# Patient Record
Sex: FEMALE | Race: WHITE | NOT HISPANIC OR LATINO | Employment: FULL TIME | URBAN - METROPOLITAN AREA
[De-identification: names, ages, dates, MRNs, and addresses within clinical notes are randomized per-mention and may not be internally consistent; named-entity substitution may affect disease eponyms.]

---

## 2023-10-31 ENCOUNTER — TELEPHONE (OUTPATIENT)
Dept: PRIMARY CARE | Facility: CLINIC | Age: 53
End: 2023-10-31
Payer: COMMERCIAL

## 2023-11-09 ENCOUNTER — TELEPHONE (OUTPATIENT)
Dept: PRIMARY CARE | Facility: CLINIC | Age: 53
End: 2023-11-09
Payer: COMMERCIAL

## 2023-11-09 NOTE — TELEPHONE ENCOUNTER
PT CALLED HAS HAD RIGHT BLOCKED SINUS X 3 WEEKS NO OTHER ISSUES. TODAY HER RIGHT EAR STARTED TO BECOME RED HOT AND PAINFUL. 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 REQUESTING SOMETHING TO BE SENT TO [facility] IF POSSIBLE, SHE IS WILLING TO SCHEDULE FOLLOW UP NEXT WEEK IF NEEDED. HER DAUGHTER CAN PICK RX UP AND DELIVER TO HER AT WORK.

## 2023-11-10 ENCOUNTER — OFFICE VISIT (OUTPATIENT)
Dept: PRIMARY CARE | Facility: CLINIC | Age: 53
End: 2023-11-10
Payer: COMMERCIAL

## 2023-11-10 VITALS
OXYGEN SATURATION: 98 % | DIASTOLIC BLOOD PRESSURE: 86 MMHG | SYSTOLIC BLOOD PRESSURE: 122 MMHG | TEMPERATURE: 98.2 F | HEART RATE: 89 BPM | HEIGHT: 65 IN | WEIGHT: 280.6 LBS | BODY MASS INDEX: 46.75 KG/M2 | RESPIRATION RATE: 18 BRPM

## 2023-11-10 DIAGNOSIS — J01.90 ACUTE NON-RECURRENT SINUSITIS, UNSPECIFIED LOCATION: Primary | ICD-10-CM

## 2023-11-10 DIAGNOSIS — J45.901 MODERATE ASTHMA WITH EXACERBATION, UNSPECIFIED WHETHER PERSISTENT (HHS-HCC): ICD-10-CM

## 2023-11-10 DIAGNOSIS — R89.9 ABNORMAL LABORATORY TEST: ICD-10-CM

## 2023-11-10 DIAGNOSIS — Z23 ENCOUNTER FOR IMMUNIZATION: ICD-10-CM

## 2023-11-10 PROBLEM — K21.9 GASTROESOPHAGEAL REFLUX DISEASE: Status: ACTIVE | Noted: 2023-11-10

## 2023-11-10 PROBLEM — E55.9 VITAMIN D DEFICIENCY: Status: ACTIVE | Noted: 2023-11-10

## 2023-11-10 PROBLEM — K59.00 CONSTIPATION: Status: ACTIVE | Noted: 2023-11-10

## 2023-11-10 PROBLEM — F43.9 STRESS AT HOME: Status: ACTIVE | Noted: 2023-11-10

## 2023-11-10 PROBLEM — K86.2 CYST OF PANCREAS (HHS-HCC): Status: ACTIVE | Noted: 2023-11-10

## 2023-11-10 PROBLEM — K82.9 DISORDER OF GALLBLADDER: Status: ACTIVE | Noted: 2023-11-10

## 2023-11-10 PROBLEM — J32.9 CHRONIC SINUSITIS: Status: ACTIVE | Noted: 2023-11-10

## 2023-11-10 PROBLEM — R05.9 COUGH: Status: ACTIVE | Noted: 2023-11-10

## 2023-11-10 PROBLEM — F41.9 ANXIETY: Status: ACTIVE | Noted: 2023-11-10

## 2023-11-10 PROBLEM — I10 HYPERTENSION: Status: ACTIVE | Noted: 2023-11-10

## 2023-11-10 PROBLEM — K46.9 ABDOMINAL HERNIA: Status: ACTIVE | Noted: 2023-11-10

## 2023-11-10 PROBLEM — G47.00 INSOMNIA: Status: ACTIVE | Noted: 2023-11-10

## 2023-11-10 LAB
ALBUMIN SERPL-MCNC: 4.2 G/DL (ref 3.5–5)
ALP BLD-CCNC: 58 U/L (ref 35–125)
ALT SERPL-CCNC: 10 U/L (ref 5–40)
ANION GAP SERPL CALC-SCNC: 13 MMOL/L
AST SERPL-CCNC: 10 U/L (ref 5–40)
BILIRUB SERPL-MCNC: 0.6 MG/DL (ref 0.1–1.2)
BUN SERPL-MCNC: 13 MG/DL (ref 8–25)
CALCIUM SERPL-MCNC: 9.2 MG/DL (ref 8.5–10.4)
CHLORIDE SERPL-SCNC: 104 MMOL/L (ref 97–107)
CO2 SERPL-SCNC: 21 MMOL/L (ref 24–31)
CREAT SERPL-MCNC: 0.7 MG/DL (ref 0.4–1.6)
GFR SERPL CREATININE-BSD FRML MDRD: >90 ML/MIN/1.73M*2
GLUCOSE SERPL-MCNC: 91 MG/DL (ref 65–99)
POTASSIUM SERPL-SCNC: 4.5 MMOL/L (ref 3.4–5.1)
PROT SERPL-MCNC: 6.7 G/DL (ref 5.9–7.9)
SODIUM SERPL-SCNC: 138 MMOL/L (ref 133–145)

## 2023-11-10 PROCEDURE — 36415 COLL VENOUS BLD VENIPUNCTURE: CPT | Performed by: NURSE PRACTITIONER

## 2023-11-10 PROCEDURE — 99213 OFFICE O/P EST LOW 20 MIN: CPT | Performed by: NURSE PRACTITIONER

## 2023-11-10 PROCEDURE — 3074F SYST BP LT 130 MM HG: CPT | Performed by: NURSE PRACTITIONER

## 2023-11-10 PROCEDURE — 90686 IIV4 VACC NO PRSV 0.5 ML IM: CPT | Performed by: NURSE PRACTITIONER

## 2023-11-10 PROCEDURE — 80053 COMPREHEN METABOLIC PANEL: CPT | Performed by: NURSE PRACTITIONER

## 2023-11-10 PROCEDURE — 1036F TOBACCO NON-USER: CPT | Performed by: NURSE PRACTITIONER

## 2023-11-10 PROCEDURE — 3079F DIAST BP 80-89 MM HG: CPT | Performed by: NURSE PRACTITIONER

## 2023-11-10 RX ORDER — MELOXICAM 15 MG/1
15 TABLET ORAL DAILY
COMMUNITY
End: 2024-05-20 | Stop reason: SDUPTHER

## 2023-11-10 RX ORDER — FLUTICASONE PROPIONATE AND SALMETEROL 500; 50 UG/1; UG/1
1 POWDER RESPIRATORY (INHALATION)
COMMUNITY
Start: 2023-07-21 | End: 2023-12-15 | Stop reason: SDUPTHER

## 2023-11-10 RX ORDER — ALBUTEROL SULFATE 0.83 MG/ML
2.5 SOLUTION RESPIRATORY (INHALATION) EVERY 6 HOURS PRN
COMMUNITY
Start: 2023-08-15

## 2023-11-10 RX ORDER — AMOXICILLIN AND CLAVULANATE POTASSIUM 875; 125 MG/1; MG/1
875 TABLET, FILM COATED ORAL 2 TIMES DAILY
Qty: 20 TABLET | Refills: 0 | Status: SHIPPED | OUTPATIENT
Start: 2023-11-10 | End: 2023-11-20

## 2023-11-10 RX ORDER — FLUCONAZOLE 100 MG/1
100 TABLET ORAL DAILY
Qty: 3 TABLET | Refills: 3 | Status: SHIPPED | OUTPATIENT
Start: 2023-11-10 | End: 2023-11-13 | Stop reason: SDUPTHER

## 2023-11-10 RX ORDER — METHYLPREDNISOLONE 4 MG/1
TABLET ORAL
Qty: 21 TABLET | Refills: 0 | Status: SHIPPED | OUTPATIENT
Start: 2023-11-10 | End: 2023-11-17

## 2023-11-10 RX ORDER — ALBUTEROL SULFATE 90 UG/1
2 AEROSOL, METERED RESPIRATORY (INHALATION) EVERY 4 HOURS
COMMUNITY
End: 2024-04-30

## 2023-11-10 RX ORDER — FLUTICASONE PROPIONATE 50 MCG
1 SPRAY, SUSPENSION (ML) NASAL DAILY
COMMUNITY
Start: 2021-08-12

## 2023-11-10 RX ORDER — ERGOCALCIFEROL 1.25 MG/1
1 CAPSULE ORAL
COMMUNITY
Start: 2023-11-01 | End: 2024-03-04

## 2023-11-10 RX ORDER — LORATADINE 10 MG/1
10 TABLET ORAL DAILY
COMMUNITY
Start: 2022-08-08

## 2023-11-10 RX ORDER — LISINOPRIL 10 MG/1
10 TABLET ORAL DAILY
COMMUNITY
Start: 2022-02-23 | End: 2024-03-04

## 2023-11-10 ASSESSMENT — PATIENT HEALTH QUESTIONNAIRE - PHQ9
1. LITTLE INTEREST OR PLEASURE IN DOING THINGS: NOT AT ALL
SUM OF ALL RESPONSES TO PHQ9 QUESTIONS 1 AND 2: 0
2. FEELING DOWN, DEPRESSED OR HOPELESS: NOT AT ALL

## 2023-11-10 ASSESSMENT — LIFESTYLE VARIABLES
SKIP TO QUESTIONS 9-10: 1
HOW OFTEN DO YOU HAVE SIX OR MORE DRINKS ON ONE OCCASION: NEVER
AUDIT-C TOTAL SCORE: 1
HOW OFTEN DO YOU HAVE A DRINK CONTAINING ALCOHOL: MONTHLY OR LESS
HOW MANY STANDARD DRINKS CONTAINING ALCOHOL DO YOU HAVE ON A TYPICAL DAY: 1 OR 2

## 2023-11-10 ASSESSMENT — ENCOUNTER SYMPTOMS
SINUS PRESSURE: 1
FATIGUE: 1
FACIAL SWELLING: 1
SINUS PAIN: 1
COUGH: 1

## 2023-11-10 ASSESSMENT — PAIN SCALES - GENERAL: PAINLEVEL: 3

## 2023-11-10 NOTE — PROGRESS NOTES
"Subjective   Patient ID: Dee Mata is a 53 y.o. female who presents for No chief complaint on file..    Has congestion no fever facial pain. history of          Review of Systems   Constitutional:  Positive for fatigue.   HENT:  Positive for ear pain, facial swelling, sinus pressure and sinus pain.    Respiratory:  Positive for cough.        Objective   /86   Pulse 89   Temp 36.8 °C (98.2 °F)   Resp 18   Ht 1.651 m (5' 5\")   Wt 127 kg (280 lb 9.6 oz)   SpO2 98%   BMI 46.69 kg/m²     Physical Exam  Constitutional:       Appearance: Normal appearance.   HENT:      Left Ear: Tympanic membrane normal.      Nose: Congestion and rhinorrhea present.      Mouth/Throat:      Mouth: Mucous membranes are moist.   Eyes:      Extraocular Movements: Extraocular movements intact.      Pupils: Pupils are equal, round, and reactive to light.   Pulmonary:      Effort: Pulmonary effort is normal.      Comments: Loose cough   Neurological:      Mental Status: She is alert.         Assessment/Plan   Problem List Items Addressed This Visit             ICD-10-CM    Exacerbation of asthma J45.901    Relevant Medications    methylPREDNISolone (Medrol Dospak) 4 mg tablets     Other Visit Diagnoses         Codes    Acute non-recurrent sinusitis, unspecified location    -  Primary J01.90    Relevant Medications    amoxicillin-pot clavulanate (Augmentin) 875-125 mg tablet    fluconazole (Diflucan) 100 mg tablet    Abnormal laboratory test     R89.9    Relevant Orders    Comprehensive Metabolic Panel    Encounter for immunization     Z23               "

## 2023-11-13 ENCOUNTER — TELEPHONE (OUTPATIENT)
Dept: PRIMARY CARE | Facility: CLINIC | Age: 53
End: 2023-11-13
Payer: COMMERCIAL

## 2023-11-13 DIAGNOSIS — J01.90 ACUTE NON-RECURRENT SINUSITIS, UNSPECIFIED LOCATION: ICD-10-CM

## 2023-11-15 RX ORDER — FLUCONAZOLE 100 MG/1
100 TABLET ORAL DAILY
Qty: 3 TABLET | Refills: 1 | Status: SHIPPED | OUTPATIENT
Start: 2023-11-15 | End: 2023-11-21

## 2023-12-13 ENCOUNTER — TELEPHONE (OUTPATIENT)
Dept: PRIMARY CARE | Facility: CLINIC | Age: 53
End: 2023-12-13
Payer: COMMERCIAL

## 2023-12-13 DIAGNOSIS — J45.40 MODERATE PERSISTENT ASTHMA, UNSPECIFIED WHETHER COMPLICATED (HHS-HCC): Primary | ICD-10-CM

## 2023-12-15 RX ORDER — FLUTICASONE PROPIONATE AND SALMETEROL 500; 50 UG/1; UG/1
1 POWDER RESPIRATORY (INHALATION)
Qty: 60 EACH | Refills: 1 | Status: SHIPPED | OUTPATIENT
Start: 2023-12-15

## 2024-01-31 ENCOUNTER — OFFICE VISIT (OUTPATIENT)
Dept: PRIMARY CARE | Facility: CLINIC | Age: 54
End: 2024-01-31
Payer: COMMERCIAL

## 2024-01-31 VITALS
WEIGHT: 285.2 LBS | BODY MASS INDEX: 47.52 KG/M2 | RESPIRATION RATE: 18 BRPM | DIASTOLIC BLOOD PRESSURE: 82 MMHG | TEMPERATURE: 98.6 F | OXYGEN SATURATION: 98 % | HEIGHT: 65 IN | SYSTOLIC BLOOD PRESSURE: 126 MMHG | HEART RATE: 90 BPM

## 2024-01-31 DIAGNOSIS — E55.9 VITAMIN D DEFICIENCY: ICD-10-CM

## 2024-01-31 DIAGNOSIS — E66.9 CLASS 2 OBESITY WITH BODY MASS INDEX (BMI) OF 38.0 TO 38.9 IN ADULT, UNSPECIFIED OBESITY TYPE, UNSPECIFIED WHETHER SERIOUS COMORBIDITY PRESENT: Primary | ICD-10-CM

## 2024-01-31 DIAGNOSIS — I10 HYPERTENSION, UNSPECIFIED TYPE: ICD-10-CM

## 2024-01-31 DIAGNOSIS — R53.83 OTHER FATIGUE: ICD-10-CM

## 2024-01-31 DIAGNOSIS — N95.1 HOT FLASHES DUE TO MENOPAUSE: ICD-10-CM

## 2024-01-31 DIAGNOSIS — Z13.220 SCREENING, LIPID: ICD-10-CM

## 2024-01-31 LAB
25(OH)D3 SERPL-MCNC: 37 NG/ML (ref 31–100)
ALBUMIN SERPL-MCNC: 4.4 G/DL (ref 3.5–5)
ALP BLD-CCNC: 65 U/L (ref 35–125)
ALT SERPL-CCNC: 6 U/L (ref 5–40)
ANION GAP SERPL CALC-SCNC: 13 MMOL/L
AST SERPL-CCNC: 9 U/L (ref 5–40)
BASOPHILS # BLD AUTO: 0.09 X10*3/UL (ref 0–0.1)
BASOPHILS NFR BLD AUTO: 0.8 %
BILIRUB SERPL-MCNC: 0.5 MG/DL (ref 0.1–1.2)
BUN SERPL-MCNC: 17 MG/DL (ref 8–25)
CALCIUM SERPL-MCNC: 9.4 MG/DL (ref 8.5–10.4)
CHLORIDE SERPL-SCNC: 104 MMOL/L (ref 97–107)
CHOLEST SERPL-MCNC: 157 MG/DL (ref 133–200)
CHOLEST/HDLC SERPL: 3.1 {RATIO}
CO2 SERPL-SCNC: 22 MMOL/L (ref 24–31)
CREAT SERPL-MCNC: 0.7 MG/DL (ref 0.4–1.6)
EGFRCR SERPLBLD CKD-EPI 2021: >90 ML/MIN/1.73M*2
EOSINOPHIL # BLD AUTO: 0.39 X10*3/UL (ref 0–0.7)
EOSINOPHIL NFR BLD AUTO: 3.4 %
ERYTHROCYTE [DISTWIDTH] IN BLOOD BY AUTOMATED COUNT: 12.1 % (ref 11.5–14.5)
GLUCOSE SERPL-MCNC: 91 MG/DL (ref 65–99)
HCT VFR BLD AUTO: 42.3 % (ref 36–46)
HDLC SERPL-MCNC: 51 MG/DL
HGB BLD-MCNC: 14.4 G/DL (ref 12–16)
IMM GRANULOCYTES # BLD AUTO: 0.07 X10*3/UL (ref 0–0.7)
IMM GRANULOCYTES NFR BLD AUTO: 0.6 % (ref 0–0.9)
LDLC SERPL CALC-MCNC: 84 MG/DL (ref 65–130)
LYMPHOCYTES # BLD AUTO: 2.44 X10*3/UL (ref 1.2–4.8)
LYMPHOCYTES NFR BLD AUTO: 21.3 %
MCH RBC QN AUTO: 30.8 PG (ref 26–34)
MCHC RBC AUTO-ENTMCNC: 34 G/DL (ref 32–36)
MCV RBC AUTO: 90 FL (ref 80–100)
MONOCYTES # BLD AUTO: 0.78 X10*3/UL (ref 0.1–1)
MONOCYTES NFR BLD AUTO: 6.8 %
NEUTROPHILS # BLD AUTO: 7.69 X10*3/UL (ref 1.2–7.7)
NEUTROPHILS NFR BLD AUTO: 67.1 %
NRBC BLD-RTO: 0 /100 WBCS (ref 0–0)
PLATELET # BLD AUTO: 262 X10*3/UL (ref 150–450)
POTASSIUM SERPL-SCNC: 4.3 MMOL/L (ref 3.4–5.1)
PROT SERPL-MCNC: 6.8 G/DL (ref 5.9–7.9)
RBC # BLD AUTO: 4.68 X10*6/UL (ref 4–5.2)
SODIUM SERPL-SCNC: 139 MMOL/L (ref 133–145)
TRIGL SERPL-MCNC: 111 MG/DL (ref 40–150)
TSH SERPL DL<=0.05 MIU/L-ACNC: 1.78 MIU/L (ref 0.27–4.2)
WBC # BLD AUTO: 11.5 X10*3/UL (ref 4.4–11.3)

## 2024-01-31 PROCEDURE — 84443 ASSAY THYROID STIM HORMONE: CPT | Performed by: NURSE PRACTITIONER

## 2024-01-31 PROCEDURE — 1036F TOBACCO NON-USER: CPT | Performed by: NURSE PRACTITIONER

## 2024-01-31 PROCEDURE — 82306 VITAMIN D 25 HYDROXY: CPT | Performed by: NURSE PRACTITIONER

## 2024-01-31 PROCEDURE — 3074F SYST BP LT 130 MM HG: CPT | Performed by: NURSE PRACTITIONER

## 2024-01-31 PROCEDURE — 99214 OFFICE O/P EST MOD 30 MIN: CPT | Performed by: NURSE PRACTITIONER

## 2024-01-31 PROCEDURE — 36415 COLL VENOUS BLD VENIPUNCTURE: CPT | Performed by: NURSE PRACTITIONER

## 2024-01-31 PROCEDURE — 80061 LIPID PANEL: CPT | Performed by: NURSE PRACTITIONER

## 2024-01-31 PROCEDURE — 85025 COMPLETE CBC W/AUTO DIFF WBC: CPT | Performed by: NURSE PRACTITIONER

## 2024-01-31 PROCEDURE — 3079F DIAST BP 80-89 MM HG: CPT | Performed by: NURSE PRACTITIONER

## 2024-01-31 PROCEDURE — 83001 ASSAY OF GONADOTROPIN (FSH): CPT | Mod: WESLAB | Performed by: NURSE PRACTITIONER

## 2024-01-31 PROCEDURE — 80053 COMPREHEN METABOLIC PANEL: CPT | Performed by: NURSE PRACTITIONER

## 2024-01-31 PROCEDURE — 3008F BODY MASS INDEX DOCD: CPT | Performed by: NURSE PRACTITIONER

## 2024-01-31 ASSESSMENT — LIFESTYLE VARIABLES
HOW OFTEN DO YOU HAVE A DRINK CONTAINING ALCOHOL: MONTHLY OR LESS
SKIP TO QUESTIONS 9-10: 1
HOW OFTEN DO YOU HAVE SIX OR MORE DRINKS ON ONE OCCASION: NEVER
AUDIT-C TOTAL SCORE: 1
HOW MANY STANDARD DRINKS CONTAINING ALCOHOL DO YOU HAVE ON A TYPICAL DAY: 1 OR 2

## 2024-01-31 ASSESSMENT — ENCOUNTER SYMPTOMS
ARTHRALGIAS: 1
FATIGUE: 1
SHORTNESS OF BREATH: 1

## 2024-01-31 ASSESSMENT — PAIN SCALES - GENERAL: PAINLEVEL: 2

## 2024-01-31 NOTE — PATIENT INSTRUCTIONS
PT IS OBESE, DISCUSSED WEIGHT LOSS, NEEDS HELP. WITH  WEIGHT LOSS. DISCUSSED WEIGHT LOSS MEDICATIONS, WILL SEE IF APPROVED.VENECIA KLINE AND WILL SEE ILSA APPROVED

## 2024-01-31 NOTE — PROGRESS NOTES
"Subjective   Patient ID: Dee Mata is a 53 y.o. female who presents for Med Management (PT IS HERE TO DISCUSS WEIGHT LOSS MEDICATIONS), Follow-up (PT WOULD LIKE REFERRAL TO PULMONOLOGY REFERRAL SHE WAS GIVEN IN THE PAST. HAD REFERRAL IN AUGUST TO DR. MOTTA BUT SHE DID NOT SCHEDULE ), and Arm Pain (PT TRIPPED OVER HER JACKET TODAY WALKING DOWN 3 STEPS IN [place]AR[place]. PT HAS LEFT ELBOW PAIN AND DISCOMFORT IN LEFT PALM/ THUMB AREA. ).    WANTS TOSEEIF ELIGIBLE FOR WEIGHT LOSS. DISCUSEED NWEIGHT PT WAS AROUND 180. HAPPY.HAS FAMILY HISTORY OF OBESITY    Arm Pain   The incident occurred 6 to 12 hours ago. The incident occurred at work. The injury mechanism was a fall. The pain is present in the left wrist. The quality of the pain is described as aching and shooting. The pain is at a severity of 4/10. The pain is mild. The pain has been Intermittent since the incident. Nothing aggravates the symptoms. She has tried elevation, ice and NSAIDs for the symptoms. The treatment provided mild relief.        Review of Systems   Constitutional:  Positive for fatigue.   Respiratory:  Positive for shortness of breath.    Musculoskeletal:  Positive for arthralgias.       Objective   /82   Pulse 90   Temp 37 °C (98.6 °F)   Resp 18   Ht 1.651 m (5' 5\")   Wt 129 kg (285 lb 3.2 oz)   SpO2 98%   BMI 47.46 kg/m²     Physical Exam  Constitutional:       General: She is not in acute distress.     Appearance: Normal appearance. She is obese.   HENT:      Head: Normocephalic and atraumatic.      Nose: Nose normal.   Cardiovascular:      Rate and Rhythm: Normal rate and regular rhythm.      Heart sounds: No murmur heard.  Pulmonary:      Effort: Pulmonary effort is normal.      Breath sounds: Normal breath sounds. No wheezing.   Abdominal:      General: Bowel sounds are normal.   Neurological:      Mental Status: She is alert.         Assessment/Plan   Problem List Items Addressed This Visit    None  Visit Diagnoses         Codes "    Class 2 obesity with body mass index (BMI) of 38.0 to 38.9 in adult, unspecified obesity type, unspecified whether serious comorbidity present    -  Primary E66.9, Z68.38

## 2024-02-01 ENCOUNTER — TELEPHONE (OUTPATIENT)
Dept: PHARMACY | Facility: CLINIC | Age: 54
End: 2024-02-01

## 2024-02-01 LAB — FSH SERPL-ACNC: 8 IU/L

## 2024-02-06 DIAGNOSIS — E66.01 CLASS 3 SEVERE OBESITY WITH BODY MASS INDEX (BMI) OF 45.0 TO 49.9 IN ADULT, UNSPECIFIED OBESITY TYPE, UNSPECIFIED WHETHER SERIOUS COMORBIDITY PRESENT (MULTI): Primary | ICD-10-CM

## 2024-02-06 RX ORDER — TIRZEPATIDE 2.5 MG/.5ML
2.5 INJECTION, SOLUTION SUBCUTANEOUS
Qty: 2 ML | Refills: 0 | Status: SHIPPED | OUTPATIENT
Start: 2024-02-06 | End: 2024-02-12 | Stop reason: SDUPTHER

## 2024-02-06 NOTE — TELEPHONE ENCOUNTER
Spoke with pt over the phone. Reviewed Zepbound MOA, side effects/precautions, storage, dosing and administration. Discussed risks and benefits, aware to call office if any issues/concerns with side effects. History of pancreatitis in 2017, also had lap janie post mono. Pt understands the increased risk of recurrent pancreatitis with a personal history and would like to proceed. Provider informed of patient's history and also ok with proceeding with GLP-1 agonist treatment.   Medication counseling provided by: ORI JacksonD, BCPS

## 2024-02-10 DIAGNOSIS — R00.2 PALPITATIONS: ICD-10-CM

## 2024-02-12 DIAGNOSIS — E66.01 CLASS 3 SEVERE OBESITY WITH BODY MASS INDEX (BMI) OF 45.0 TO 49.9 IN ADULT, UNSPECIFIED OBESITY TYPE, UNSPECIFIED WHETHER SERIOUS COMORBIDITY PRESENT (MULTI): ICD-10-CM

## 2024-02-12 RX ORDER — DEXLANSOPRAZOLE 60 MG/1
1 CAPSULE, DELAYED RELEASE ORAL DAILY
Qty: 90 CAPSULE | Refills: 1 | Status: SHIPPED | OUTPATIENT
Start: 2024-02-12

## 2024-02-12 RX ORDER — TIRZEPATIDE 2.5 MG/.5ML
2.5 INJECTION, SOLUTION SUBCUTANEOUS
Qty: 2 ML | Refills: 0 | Status: SHIPPED | OUTPATIENT
Start: 2024-02-12 | End: 2024-03-20 | Stop reason: SDUPTHER

## 2024-02-12 NOTE — TELEPHONE ENCOUNTER
Pt called and states Zepbound is approved if sent [facility]. Pharmacy loaded

## 2024-03-02 DIAGNOSIS — E55.9 VITAMIN D DEFICIENCY: Primary | ICD-10-CM

## 2024-03-02 DIAGNOSIS — I10 HYPERTENSION, UNSPECIFIED TYPE: ICD-10-CM

## 2024-03-04 ENCOUNTER — TELEPHONE (OUTPATIENT)
Dept: PRIMARY CARE | Facility: CLINIC | Age: 54
End: 2024-03-04
Payer: COMMERCIAL

## 2024-03-04 DIAGNOSIS — R11.2 NAUSEA AND VOMITING, UNSPECIFIED VOMITING TYPE: Primary | ICD-10-CM

## 2024-03-04 RX ORDER — LISINOPRIL 10 MG/1
10 TABLET ORAL DAILY
Qty: 90 TABLET | Refills: 0 | Status: SHIPPED | OUTPATIENT
Start: 2024-03-04 | End: 2024-05-30

## 2024-03-04 RX ORDER — ONDANSETRON 4 MG/1
8 TABLET, FILM COATED ORAL EVERY 8 HOURS PRN
Qty: 15 TABLET | Refills: 0 | Status: SHIPPED | OUTPATIENT
Start: 2024-03-04 | End: 2024-03-09

## 2024-03-04 RX ORDER — ERGOCALCIFEROL 1.25 MG/1
1 CAPSULE ORAL
Qty: 12 CAPSULE | Refills: 0 | Status: SHIPPED | OUTPATIENT
Start: 2024-03-04 | End: 2024-05-28

## 2024-03-04 NOTE — TELEPHONE ENCOUNTER
Pt called and is requesting zofran. Pt is home with stomach bug. Pt states she is on zepbound and wants to know when she can restart med. Pt afraid she will get dehydrated if she starts med too quickly.

## 2024-03-04 NOTE — TELEPHONE ENCOUNTER
Pt called and is requesting zofran. Pt is home with stomach bug and has vomiting/diarrhea. Requesting Zofran. Pt states she is on zepbound and wants to know when she can restart med. Pt afraid she will get dehydrated if she starts med too quickly.

## 2024-03-20 ENCOUNTER — OFFICE VISIT (OUTPATIENT)
Dept: PRIMARY CARE | Facility: CLINIC | Age: 54
End: 2024-03-20
Payer: COMMERCIAL

## 2024-03-20 VITALS
SYSTOLIC BLOOD PRESSURE: 124 MMHG | HEART RATE: 100 BPM | WEIGHT: 265 LBS | RESPIRATION RATE: 18 BRPM | HEIGHT: 65 IN | OXYGEN SATURATION: 96 % | BODY MASS INDEX: 44.15 KG/M2 | DIASTOLIC BLOOD PRESSURE: 88 MMHG | TEMPERATURE: 97.5 F

## 2024-03-20 DIAGNOSIS — E66.01 CLASS 3 SEVERE OBESITY WITH BODY MASS INDEX (BMI) OF 45.0 TO 49.9 IN ADULT, UNSPECIFIED OBESITY TYPE, UNSPECIFIED WHETHER SERIOUS COMORBIDITY PRESENT (MULTI): ICD-10-CM

## 2024-03-20 DIAGNOSIS — E66.9 CLASS 2 OBESITY WITH BODY MASS INDEX (BMI) OF 38.0 TO 38.9 IN ADULT, UNSPECIFIED OBESITY TYPE, UNSPECIFIED WHETHER SERIOUS COMORBIDITY PRESENT: Primary | ICD-10-CM

## 2024-03-20 PROBLEM — T46.4X5A ADVERSE EFFECT OF ANGIOTENSIN-CONVERTING ENZYME INHIBITOR: Status: ACTIVE | Noted: 2024-03-20

## 2024-03-20 PROBLEM — M94.20 CHONDROMALACIA: Status: ACTIVE | Noted: 2022-04-15

## 2024-03-20 PROCEDURE — 99213 OFFICE O/P EST LOW 20 MIN: CPT | Performed by: NURSE PRACTITIONER

## 2024-03-20 PROCEDURE — 3008F BODY MASS INDEX DOCD: CPT | Performed by: NURSE PRACTITIONER

## 2024-03-20 PROCEDURE — 3074F SYST BP LT 130 MM HG: CPT | Performed by: NURSE PRACTITIONER

## 2024-03-20 PROCEDURE — 3079F DIAST BP 80-89 MM HG: CPT | Performed by: NURSE PRACTITIONER

## 2024-03-20 PROCEDURE — 1036F TOBACCO NON-USER: CPT | Performed by: NURSE PRACTITIONER

## 2024-03-20 RX ORDER — TIRZEPATIDE 2.5 MG/.5ML
2.5 INJECTION, SOLUTION SUBCUTANEOUS
Qty: 2 ML | Refills: 2 | Status: SHIPPED | OUTPATIENT
Start: 2024-03-20 | End: 2024-05-19 | Stop reason: SDUPTHER

## 2024-03-20 ASSESSMENT — LIFESTYLE VARIABLES
HOW OFTEN DO YOU HAVE A DRINK CONTAINING ALCOHOL: MONTHLY OR LESS
HOW OFTEN DO YOU HAVE SIX OR MORE DRINKS ON ONE OCCASION: NEVER
AUDIT-C TOTAL SCORE: 1
SKIP TO QUESTIONS 9-10: 1
HOW MANY STANDARD DRINKS CONTAINING ALCOHOL DO YOU HAVE ON A TYPICAL DAY: 1 OR 2

## 2024-03-20 ASSESSMENT — PATIENT HEALTH QUESTIONNAIRE - PHQ9
1. LITTLE INTEREST OR PLEASURE IN DOING THINGS: NOT AT ALL
SUM OF ALL RESPONSES TO PHQ9 QUESTIONS 1 AND 2: 0
1. LITTLE INTEREST OR PLEASURE IN DOING THINGS: NOT AT ALL
2. FEELING DOWN, DEPRESSED OR HOPELESS: NOT AT ALL
SUM OF ALL RESPONSES TO PHQ9 QUESTIONS 1 AND 2: 0
2. FEELING DOWN, DEPRESSED OR HOPELESS: NOT AT ALL

## 2024-03-20 ASSESSMENT — PAIN SCALES - GENERAL: PAINLEVEL: 0-NO PAIN

## 2024-03-21 ENCOUNTER — TELEPHONE (OUTPATIENT)
Dept: PRIMARY CARE | Facility: CLINIC | Age: 54
End: 2024-03-21
Payer: COMMERCIAL

## 2024-03-21 DIAGNOSIS — E66.01 CLASS 3 SEVERE OBESITY WITHOUT SERIOUS COMORBIDITY WITH BODY MASS INDEX (BMI) OF 40.0 TO 44.9 IN ADULT, UNSPECIFIED OBESITY TYPE (MULTI): Primary | ICD-10-CM

## 2024-03-21 NOTE — TELEPHONE ENCOUNTER
Pharmacy called and needs clarification on Zepbound directions. Pharmacy states zepbound comes in a box of 4 pens which is 2 ml. Needs clarification

## 2024-03-21 NOTE — PROGRESS NOTES
"Subjective   Patient ID: Dee Mata is a 53 y.o. female who presents for Follow-up (Pt here for follow up on zepbound. Pt states med is working well and feels great).    HERE FOR MED CHECK. NO ISSUES. DOING WEL.L NO PROBLEMS HAPPY       Review of Systems   All other systems reviewed and are negative.      Objective   /88   Pulse 100   Temp 36.4 °C (97.5 °F)   Resp 18   Ht 1.651 m (5' 5\")   Wt 120 kg (265 lb)   SpO2 96%   BMI 44.10 kg/m²     Physical Exam  Constitutional:       Appearance: Normal appearance. She is obese.   HENT:      Head: Normocephalic.      Right Ear: Tympanic membrane normal.      Left Ear: Tympanic membrane normal.      Nose: Congestion present.      Mouth/Throat:      Mouth: Mucous membranes are moist.   Cardiovascular:      Rate and Rhythm: Normal rate and regular rhythm.   Pulmonary:      Effort: Pulmonary effort is normal.   Abdominal:      General: Bowel sounds are normal.       Assessment/Plan          "

## 2024-03-22 RX ORDER — TIRZEPATIDE 5 MG/.5ML
5 INJECTION, SOLUTION SUBCUTANEOUS
Qty: 2 ML | Refills: 0 | Status: SHIPPED | OUTPATIENT
Start: 2024-03-22

## 2024-03-25 PROCEDURE — RXMED WILLOW AMBULATORY MEDICATION CHARGE

## 2024-03-26 ENCOUNTER — PHARMACY VISIT (OUTPATIENT)
Dept: PHARMACY | Facility: CLINIC | Age: 54
End: 2024-03-26
Payer: MEDICARE

## 2024-04-15 ENCOUNTER — OFFICE VISIT (OUTPATIENT)
Dept: PRIMARY CARE | Facility: CLINIC | Age: 54
End: 2024-04-15
Payer: COMMERCIAL

## 2024-04-15 VITALS
RESPIRATION RATE: 20 BRPM | OXYGEN SATURATION: 96 % | BODY MASS INDEX: 42.32 KG/M2 | HEIGHT: 65 IN | WEIGHT: 254 LBS | HEART RATE: 116 BPM | TEMPERATURE: 97.9 F

## 2024-04-15 DIAGNOSIS — J45.901 MODERATE ASTHMA WITH EXACERBATION, UNSPECIFIED WHETHER PERSISTENT (HHS-HCC): Primary | ICD-10-CM

## 2024-04-15 PROCEDURE — 3008F BODY MASS INDEX DOCD: CPT | Performed by: NURSE PRACTITIONER

## 2024-04-15 PROCEDURE — 1036F TOBACCO NON-USER: CPT | Performed by: NURSE PRACTITIONER

## 2024-04-15 PROCEDURE — 99213 OFFICE O/P EST LOW 20 MIN: CPT | Performed by: NURSE PRACTITIONER

## 2024-04-15 RX ORDER — PREDNISONE 20 MG/1
TABLET ORAL
Qty: 21 TABLET | Refills: 0 | Status: SHIPPED | OUTPATIENT
Start: 2024-04-15 | End: 2024-04-30 | Stop reason: SDUPTHER

## 2024-04-15 ASSESSMENT — PAIN SCALES - GENERAL: PAINLEVEL: 0-NO PAIN

## 2024-04-15 ASSESSMENT — PATIENT HEALTH QUESTIONNAIRE - PHQ9
1. LITTLE INTEREST OR PLEASURE IN DOING THINGS: NOT AT ALL
2. FEELING DOWN, DEPRESSED OR HOPELESS: NOT AT ALL
SUM OF ALL RESPONSES TO PHQ9 QUESTIONS 1 AND 2: 0

## 2024-04-15 ASSESSMENT — ENCOUNTER SYMPTOMS
WHEEZING: 1
CHEST TIGHTNESS: 1
COUGH: 1
SHORTNESS OF BREATH: 1

## 2024-04-15 NOTE — PATIENT INSTRUCTIONS
Discussed if worsening call. Use nebulizer. Trying an new inhaler, discussed should have a consultation with pulmonary

## 2024-04-15 NOTE — PROGRESS NOTES
"Subjective   Patient ID: Dee Mata is a 53 y.o. female who presents for Cough (Pt c/o cough, congestion, pain in teeth since last tuesday).    Started this past weekend. Has had a hard time breathing. Wheezing           Review of Systems   Respiratory:  Positive for cough, chest tightness, shortness of breath and wheezing.        Objective   Pulse (!) 116   Temp 36.6 °C (97.9 °F)   Resp 20   Ht 1.651 m (5' 5\")   Wt 115 kg (254 lb)   SpO2 96%   BMI 42.27 kg/m²     Physical Exam  Constitutional:       Appearance: Normal appearance.   Cardiovascular:      Rate and Rhythm: Normal rate and regular rhythm.   Pulmonary:      Breath sounds: Wheezing present.   Neurological:      Mental Status: She is alert.         Assessment/Plan   Problem List Items Addressed This Visit             ICD-10-CM    Exacerbation of asthma ([facility]) - Primary J45.901    Relevant Medications    predniSONE (Deltasone) 20 mg tablet    budesonide-glycopyr-formoterol (BREZTRI) 160-9-4.8 mcg/actuation HFA aerosol inhaler    Other Relevant Orders    Referral to Pulmonology          "

## 2024-04-18 PROCEDURE — RXMED WILLOW AMBULATORY MEDICATION CHARGE

## 2024-04-25 ENCOUNTER — PHARMACY VISIT (OUTPATIENT)
Dept: PHARMACY | Facility: CLINIC | Age: 54
End: 2024-04-25
Payer: MEDICARE

## 2024-04-29 DIAGNOSIS — J45.901 MODERATE ASTHMA WITH EXACERBATION, UNSPECIFIED WHETHER PERSISTENT (HHS-HCC): Primary | ICD-10-CM

## 2024-04-29 DIAGNOSIS — J45.901 MODERATE ASTHMA WITH EXACERBATION, UNSPECIFIED WHETHER PERSISTENT (HHS-HCC): ICD-10-CM

## 2024-04-29 RX ORDER — PREDNISONE 20 MG/1
TABLET ORAL
Qty: 21 TABLET | Refills: 0 | Status: CANCELLED | OUTPATIENT
Start: 2024-04-29

## 2024-04-30 DIAGNOSIS — J45.901 MODERATE ASTHMA WITH EXACERBATION, UNSPECIFIED WHETHER PERSISTENT (HHS-HCC): ICD-10-CM

## 2024-04-30 RX ORDER — ALBUTEROL SULFATE 90 UG/1
2 AEROSOL, METERED RESPIRATORY (INHALATION) EVERY 4 HOURS
Qty: 25.5 G | Refills: 0 | Status: SHIPPED | OUTPATIENT
Start: 2024-04-30

## 2024-04-30 RX ORDER — PREDNISONE 20 MG/1
TABLET ORAL
Qty: 21 TABLET | Refills: 0 | Status: SHIPPED | OUTPATIENT
Start: 2024-04-30

## 2024-05-06 NOTE — PROGRESS NOTES
Patient: Dee Mata    [id number]  : 1970 -- AGE 53 y.o.    Provider: VERONIQUE Reeves-CNP     Location [facility]   Service Date: 2024       Department of Medicine  Division of Pulmonary, Critical Care, and Sleep Medicine       [facility]  New Visit Note    HISTORY OF PRESENT ILLNESS     The patient's referring provider is: Rosina Mackey APRN*    PCP: Taisha Mackey CNP     HISTORY OF PRESENT ILLNESS   Dee Mata is a 53 y.o. female who presents to a [facility] for an evaluation with concerns of asthma.  I have independently interviewed and examined the patient in the office and reviewed available records.    Current History  Patient presents to pulmonary clinic today after referral by primary care for concerns of asthma.  PFT on record.  Upon chart review it appears that patient was given Breztri inhaler and a prednisone taper by the primary care on 4/15/2024.  Patient then requested a refill of the steroids on 2024.  Looking back it appears that her cell counts have been elevated; most recent from 2024 at 390.  Prior counts of 590, 620, 440.  Asthma appears to be eosinophilic in nature.    On today's visit, the patient reports she never started the Breztri; has just continued on her usual Advair 500 (her favorite maintenance inhaler; has been on this for a number of years). Was diagnosed around age 10. Formerly saw Dr. Morataya. Had COVID 2023 (not hospitalized) and ever since then, asthma has drastically worsened. Typically August (when humid) and Fall seasons are her major triggers. Also states pollen can trigger her. States she will only take her Advair 500 once a day typically prior to COVID; but since COVID she has been taking it twice a day. Has only been using the Advair 500 BID as its intended for the past 3-4 weeks consistently. Took the first round of steroids; helped. Did not  take the second round of steroids. Has had 2 courses of prednisone in the past year. Usually needs 2 courses of prednisone every single year. No chronic cough. Has lost 40 lbs in the past 4 months (intentional; on tirzepitide). Wheezing in the morning. No productive mucous. No recent fever, sweats, chills. No orthopnea, no lower leg swelling. No CP, palpitations. Uses Claritin and Flonase; uses year round. Has not been on Singulair for a number of years. Over the past 3 weeks, has needed albuterol 3+ days a week.    Denies premature birth. Diagnosed with asthma age 10; undiagnosed years prior. No pulmonary hospitalizations. Has never been on home oxygen therapy before.    Has never completed a sleep study before. She does snore (large tonsils) if laying on her back. No AM headaches. No daytime fatigue.    CAT Today: 4  ACT Today: 17  ESS Today: 2    Prior Notes & History       REVIEW OF SYSTEMS     REVIEW OF SYSTEMS  Review of Systems   Constitutional:  Negative for activity change, appetite change, chills, fatigue, fever and unexpected weight change.   HENT:  Positive for rhinorrhea and sinus pressure. Negative for congestion, postnasal drip, sinus pain, sneezing, sore throat, trouble swallowing and voice change.         Denies throat clearing   Eyes:  Negative for redness and itching.   Respiratory:  Positive for cough, chest tightness, shortness of breath and wheezing. Negative for stridor.    Cardiovascular:  Negative for chest pain, palpitations and leg swelling.        Denies orthopnea   Gastrointestinal:  Negative for abdominal pain, diarrhea, nausea and vomiting.        Denies acid reflux   Musculoskeletal:  Negative for arthralgias, back pain, joint swelling and myalgias.   Skin:  Negative for rash.   Allergic/Immunologic: Negative for immunocompromised state.   Neurological:  Negative for dizziness, tremors, weakness and headaches.   Hematological:  Does not bruise/bleed easily.   Psychiatric/Behavioral:   Negative for agitation and sleep disturbance. The patient is not nervous/anxious.         Denies depression   All other systems reviewed and are negative.      ALLERGIES AND MEDICATIONS     ALLERGIES  Allergies   Allergen Reactions    Adhesive Hives    Cigarette Smoke Anaphylaxis    Codeine Nausea/vomiting    House Dust Mite Wheezing    Mold Wheezing    Morphine Nausea/vomiting    Benzoin Unknown    Cephalexin Hives    Clarithromycin Unknown and Other     Vision changes    Morphine Sulfate Unknown       MEDICATIONS  Current Outpatient Medications   Medication Sig Dispense Refill    albuterol 2.5 mg /3 mL (0.083 %) nebulizer solution Take 3 mL (2.5 mg) by nebulization every 6 hours if needed.      albuterol 90 mcg/actuation inhaler INHALE TWO PUFFS BY MOUTH EVERY 4 HOURS 25.5 g 0    ergocalciferol (Vitamin D-2) 1.25 MG (57478 UT) capsule TAKE ONE CAPSULE BY MOUTH ONCE A WEEK 12 capsule 0    fluticasone (Flonase Allergy Relief) 50 mcg/actuation nasal spray Administer 1 spray into each nostril once daily.      fluticasone propion-salmeteroL (Advair Diskus) 500-50 mcg/dose diskus inhaler Inhale 1 puff 2 times a day. 60 each 1    lisinopril 10 mg tablet TAKE ONE TABLET BY MOUTH once DAILY 90 tablet 0    loratadine (Claritin) 10 mg tablet Take 1 tablet (10 mg) by mouth once daily.      meloxicam (Mobic) 15 mg tablet Take 1 tablet (15 mg) by mouth once daily.      tirzepatide, weight loss, (Zepbound) 2.5 mg/0.5 mL injection Inject 2.5 mg under the skin every 7 days. 2 mL 2    dexlansoprazole (Dexilant) 60 mg DR capsule TAKE ONE CAPSULE BY MOUTH EVERY DAY (Patient not taking: Reported on 5/7/2024) 90 capsule 1    montelukast (Singulair) 10 mg tablet Take 1 tablet (10 mg) by mouth once daily at bedtime. 30 tablet 3    predniSONE (Deltasone) 20 mg tablet Take 3 tabs (60mg) daily for 5 days, then take 2 tabs (40mg) daily for 2 days, then take 1 tab (20mg) daily for 2 days. (Patient not taking: Reported on 5/7/2024) 21 tablet 0     tirzepatide, weight loss, (Zepbound) 5 mg/0.5 mL injection Inject 5 mg under the skin every 7 days. (Patient not taking: Reported on 4/15/2024) 0.5 mL 2    tirzepatide, weight loss, (Zepbound) 5 mg/0.5 mL injection Inject 5 mg under the skin every 7 days. (Patient not taking: Reported on 5/7/2024) 2 mL 0     No current facility-administered medications for this visit.       PAST HISTORY     PAST MEDICAL HISTORY  She  has no past medical history on file.    PAST SURGICAL HISTORY  Past Surgical History:   Procedure Laterality Date    CHOLECYSTECTOMY  2017    HERNIA REPAIR  1977    HERNIA REPAIR  1977    HYSTERECTOMY  2012    TOTAL HIP ARTHROPLASTY Right 04/28/2021       IMMUNIZATION HISTORY  Immunization History   Administered Date(s) Administered    Flu vaccine (IIV4), preservative free *Check age/dose* 09/15/2017, 09/12/2018, 09/26/2019, 09/30/2020, 10/07/2022, 11/10/2023    Hepatitis A vaccine, age 19 years and greater (HAVRIX) 10/14/2011    Hepatitis B vaccine, adult (RECOMBIVAX, ENGERIX) 10/19/2011, 05/02/2012    Hepatitis B vaccine, pediatric/adolescent (RECOMBIVAX, ENGERIX) 11/30/2011    Influenza, injectable, quadrivalent 10/29/2014, 10/15/2015, 10/26/2021    Influenza, seasonal, injectable 01/24/2004, 11/25/2011, 11/28/2011, 10/21/2013    Moderna SARS-CoV-2 Vaccination 02/24/2021, 02/25/2021, 03/25/2021, 11/13/2021, 07/19/2022    Tdap vaccine, age 7 year and older (BOOSTRIX, ADACEL) 10/19/2022       SOCIAL HISTORY  She  reports that she has never smoked. She has been exposed to tobacco smoke. She has never used smokeless tobacco. She reports current alcohol use. She reports that she does not currently use drugs.     OCCUPATIONAL/ENVIRONMENTAL HISTORY  Previously worked as: no exposures  Currently works as: teacher x28 years  DOES/DOES NOT: does not have known exposure to asbestos, silica, beryllium or inhaled metals.  DOES/DOES NOT: does not have exposure to birds or exotic animals.    FAMILY  "HISTORY  Family History   Adopted: Yes   Problem Relation Name Age of Onset    Lupus Mother      Autoimmune disease Father       DOES/DOES NOT: does not have a family history of pulmonary disease.  DOES/DOES NOT: does have a family history of cancer.  DOES/DOES NOT: does have a family history of autoimmune disorders.  -Mother; lupus    PHYSICAL EXAM     VITAL SIGNS: /83   Pulse 86   Ht 1.664 m (5' 5.5\")   Wt 113 kg (250 lb)   SpO2 99%   BMI 40.97 kg/m²      PREVIOUS WEIGHTS:  Wt Readings from Last 3 Encounters:   05/07/24 113 kg (250 lb)   04/15/24 115 kg (254 lb)   03/20/24 120 kg (265 lb)       Physical Exam  Vitals reviewed.   Constitutional:       General: She is not in acute distress.     Appearance: Normal appearance. She is not ill-appearing or toxic-appearing.   HENT:      Head: Normocephalic.      Nose: No rhinorrhea.   Cardiovascular:      Rate and Rhythm: Normal rate and regular rhythm.      Heart sounds: Normal heart sounds.   Pulmonary:      Effort: Pulmonary effort is normal. No respiratory distress.      Breath sounds: Normal breath sounds. No stridor. No wheezing, rhonchi or rales.   Abdominal:      General: Abdomen is flat.   Musculoskeletal:         General: Normal range of motion.      Right lower leg: No edema.      Left lower leg: No edema.   Skin:     General: Skin is warm and dry.      Nails: There is no clubbing.   Neurological:      General: No focal deficit present.      Mental Status: She is alert and oriented to person, place, and time.   Psychiatric:         Mood and Affect: Mood normal.         Behavior: Behavior normal.         Judgment: Judgment normal.         RESULTS/DATA     Pulmonary Function Test Results     No PFT on record    Chest Radiograph   CXR  12/6/21: IMPRESSION: No acute cardiopulmonary disease.   2/3/20: IMPRESSION: No acute abnormality.    Chest CT Scan     No results found for this or any previous visit from the past 365 days.      Echocardiogram & Cardiac " "Studies     No results found for this or any previous visit from the past 365 days.       Labwork & Pathology   Complete Blood Count  Lab Results   Component Value Date    WBC 11.5 (H) 01/31/2024    HGB 14.4 01/31/2024    HCT 42.3 01/31/2024    MCV 90 01/31/2024     01/31/2024       Peripheral Eosinophil Count:   Eosinophils Absolute   Date Value   01/31/2024 0.39 x10*3/uL   03/23/2022 0.59 K/UL (H)   03/01/2022 0.62 K/UL (H)   08/12/2021 0.44 K/UL       Serum Immunoglobulin E:    No results found for: \"IGE\"     Metabolic Parameters  Sodium   Date/Time Value Ref Range Status   01/31/2024 02:45  133 - 145 mmol/L Final     Potassium   Date/Time Value Ref Range Status   01/31/2024 02:45 PM 4.3 3.4 - 5.1 mmol/L Final     Chloride   Date/Time Value Ref Range Status   01/31/2024 02:45  97 - 107 mmol/L Final     Bicarbonate   Date/Time Value Ref Range Status   01/31/2024 02:45 PM 22 (L) 24 - 31 mmol/L Final     Anion Gap   Date/Time Value Ref Range Status   01/31/2024 02:45 PM 13 <=19 mmol/L Final     Urea Nitrogen   Date/Time Value Ref Range Status   01/31/2024 02:45 PM 17 8 - 25 mg/dL Final     Creatinine   Date/Time Value Ref Range Status   01/31/2024 02:45 PM 0.70 0.40 - 1.60 mg/dL Final     Glucose   Date/Time Value Ref Range Status   01/31/2024 02:45 PM 91 65 - 99 mg/dL Final     Calcium   Date/Time Value Ref Range Status   01/31/2024 02:45 PM 9.4 8.5 - 10.4 mg/dL Final     AST   Date/Time Value Ref Range Status   01/31/2024 02:45 PM 9 5 - 40 U/L Final     ALT   Date/Time Value Ref Range Status   01/31/2024 02:45 PM 6 5 - 40 U/L Final       Bronchoscopy & Pathology/Cultures       ASSESSMENT/PLAN     Ms. Mata is a 53 y.o. female; was referred to the [facility] for evaluation of asthma.    Problem List and Orders  Diagnoses and all orders for this visit:  Severe persistent asthma without complication (Multi)  -     montelukast (Singulair) 10 mg tablet; Take 1 " tablet (10 mg) by mouth once daily at bedtime.  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  Has never been hospitalized for her asthma.  In the majority of her adulthood she has been on Advair 500; typically only uses it once a day as opposed to the prescribed twice a day dosing and felt like she was well-controlled over the years.  However, when she had COVID August 2023; her asthma significantly worsened.  Over the past month she has been taking her Advair 500 as prescribed twice a day; yet is still not well-controlled.  Historically over many years she typically requires at least 2+ courses of oral steroids annually.  Has needed 2 courses in the past calendar year.  No recent PFT on record.  Historically her eosinophils have been elevated; most recent from 1/31/2024 at 390.  Prior counts of 590, 620, 440.   -Ordering full PFT with FeNO  -Ordering ImmunoCAP IgE level (I already know that her asthma is eosinophilic; need to check allergy level as well)  -Continue Advair discus 500; 1 inhalation twice a day.  Rinse mouth after use.  -Continue albuterol HFA as needed  -Start Singulair 10 mg daily at bedtime (was previously on this a number of years ago and did well with it; no side effects)  -Discussed biologic therapy today; plan will be to get her started on this within the next month pending her phenotyping results.  -Patient prefers home injection through pen if possible  -Continue Flonase and Claritin daily for chronic allergies    RTC (to be determined); would like to see her 2 months after first biologic injection

## 2024-05-07 ENCOUNTER — OFFICE VISIT (OUTPATIENT)
Dept: PULMONOLOGY | Facility: CLINIC | Age: 54
End: 2024-05-07
Payer: COMMERCIAL

## 2024-05-07 VITALS
WEIGHT: 250 LBS | HEART RATE: 86 BPM | HEIGHT: 66 IN | OXYGEN SATURATION: 99 % | SYSTOLIC BLOOD PRESSURE: 120 MMHG | DIASTOLIC BLOOD PRESSURE: 83 MMHG | BODY MASS INDEX: 40.18 KG/M2

## 2024-05-07 DIAGNOSIS — J32.9 CHRONIC RHINOSINUSITIS: ICD-10-CM

## 2024-05-07 DIAGNOSIS — J45.50 SEVERE PERSISTENT ASTHMA WITHOUT COMPLICATION (MULTI): Primary | ICD-10-CM

## 2024-05-07 DIAGNOSIS — J82.83 EOSINOPHILIC ASTHMA (HHS-HCC): ICD-10-CM

## 2024-05-07 PROCEDURE — 99204 OFFICE O/P NEW MOD 45 MIN: CPT | Performed by: NURSE PRACTITIONER

## 2024-05-07 PROCEDURE — 99214 OFFICE O/P EST MOD 30 MIN: CPT | Performed by: NURSE PRACTITIONER

## 2024-05-07 PROCEDURE — 3008F BODY MASS INDEX DOCD: CPT | Performed by: NURSE PRACTITIONER

## 2024-05-07 PROCEDURE — 1036F TOBACCO NON-USER: CPT | Performed by: NURSE PRACTITIONER

## 2024-05-07 PROCEDURE — 3079F DIAST BP 80-89 MM HG: CPT | Performed by: NURSE PRACTITIONER

## 2024-05-07 PROCEDURE — 3074F SYST BP LT 130 MM HG: CPT | Performed by: NURSE PRACTITIONER

## 2024-05-07 RX ORDER — MONTELUKAST SODIUM 10 MG/1
10 TABLET ORAL NIGHTLY
Qty: 30 TABLET | Refills: 3 | Status: SHIPPED | OUTPATIENT
Start: 2024-05-07

## 2024-05-07 ASSESSMENT — ENCOUNTER SYMPTOMS
TROUBLE SWALLOWING: 0
EYE ITCHING: 0
FEVER: 0
CHILLS: 0
DIARRHEA: 0
CHEST TIGHTNESS: 1
SLEEP DISTURBANCE: 0
TREMORS: 0
AGITATION: 0
FATIGUE: 0
ROS GI COMMENTS: DENIES ACID REFLUX
BACK PAIN: 0
MYALGIAS: 0
APPETITE CHANGE: 0
ACTIVITY CHANGE: 0
STRIDOR: 0
ABDOMINAL PAIN: 0
NAUSEA: 0
WEAKNESS: 0
ARTHRALGIAS: 0
EYE REDNESS: 0
COUGH: 1
VOMITING: 0
BRUISES/BLEEDS EASILY: 0
RHINORRHEA: 1
SORE THROAT: 0
JOINT SWELLING: 0
SINUS PRESSURE: 1
UNEXPECTED WEIGHT CHANGE: 0
DIZZINESS: 0
HEADACHES: 0
NERVOUS/ANXIOUS: 0
SINUS PAIN: 0
PALPITATIONS: 0
VOICE CHANGE: 0
WHEEZING: 1
SHORTNESS OF BREATH: 1

## 2024-05-07 ASSESSMENT — PATIENT HEALTH QUESTIONNAIRE - PHQ9
1. LITTLE INTEREST OR PLEASURE IN DOING THINGS: NOT AT ALL
2. FEELING DOWN, DEPRESSED OR HOPELESS: NOT AT ALL
SUM OF ALL RESPONSES TO PHQ9 QUESTIONS 1 & 2: 0

## 2024-05-07 ASSESSMENT — PAIN SCALES - GENERAL: PAINLEVEL: 0-NO PAIN

## 2024-05-07 ASSESSMENT — LIFESTYLE VARIABLES
SKIP TO QUESTIONS 9-10: 1
HOW OFTEN DO YOU HAVE A DRINK CONTAINING ALCOHOL: MONTHLY OR LESS
HOW OFTEN DO YOU HAVE SIX OR MORE DRINKS ON ONE OCCASION: NEVER
AUDIT-C TOTAL SCORE: 1
HOW MANY STANDARD DRINKS CONTAINING ALCOHOL DO YOU HAVE ON A TYPICAL DAY: 1 OR 2

## 2024-05-07 NOTE — PATIENT INSTRUCTIONS
"Today we discussed your pulmonary care, your asthma and how we are going to get it under better control.    -Ordering pulmonary function testing. (Please stop using your daily maintenance inhaler(s) for 2 days prior to testing date. You may use your as needed albuterol inhaler/nebulizer during this time frame if needed. Please resume your daily maintenance inhaler(s) after completion of testing).  -Continue Advair discus 500; 1 inhalation twice a day.  Rinse mouth after use. (It is of the utmost importance that you take this twice a day)  -Continue Albuterol Inhaler; 2 puffs every 4-6 hours as needed for shortness of breath. You can also take this 10-15 minutes prior to exertional activity to help \"prime\" your lungs.  -Start Singulair; 1 pill daily at bedtime  -Continue Claritin and Flonase for allergies  -Ordering 1 lab to check your inflammatory allergy level  -We discussed starting biologic therapy today; we will get this started within the next month or 2 pending your test results    Thank you for visiting the pulmonary clinic today! It was a pleasure to participate in your care.  Please return to clinic (to be determined; will want to see you about 2 months after starting your biologic therapy) or sooner if needed.    Nelson Jackson, CNP  My Office Number: [phone]   CT Scheduling: [phone]  PFT/Follow Up Visit Scheduling: [phone]  My Nurse: PRASHANT Longoria  My [place]: [place]    **For immediate needs such as medication issues/refills, active sick symptoms/medical concerns; I ask that you please call the office and speak to the pulmonary nurse. MyChart messages do not come directly to me. There can sometimes be a delay before I am aware of any messages that were sent. Thank you.**        "

## 2024-05-15 ENCOUNTER — HOSPITAL ENCOUNTER (OUTPATIENT)
Dept: RESPIRATORY THERAPY | Facility: HOSPITAL | Age: 54
Discharge: HOME | End: 2024-05-15
Payer: COMMERCIAL

## 2024-05-15 ENCOUNTER — HOSPITAL ENCOUNTER (OUTPATIENT)
Dept: RESPIRATORY THERAPY | Facility: HOSPITAL | Age: 54
End: 2024-05-15
Payer: COMMERCIAL

## 2024-05-15 DIAGNOSIS — J45.50 SEVERE PERSISTENT ASTHMA WITHOUT COMPLICATION (MULTI): ICD-10-CM

## 2024-05-15 PROCEDURE — 94760 N-INVAS EAR/PLS OXIMETRY 1: CPT

## 2024-05-15 PROCEDURE — 94726 PLETHYSMOGRAPHY LUNG VOLUMES: CPT | Performed by: INTERNAL MEDICINE

## 2024-05-15 PROCEDURE — 94729 DIFFUSING CAPACITY: CPT

## 2024-05-15 PROCEDURE — 98960 EDU&TRN PT SELF-MGMT NQHP 1: CPT

## 2024-05-15 PROCEDURE — 95012 NITRIC OXIDE EXP GAS DETER: CPT

## 2024-05-15 PROCEDURE — 94060 EVALUATION OF WHEEZING: CPT | Performed by: INTERNAL MEDICINE

## 2024-05-15 PROCEDURE — 94060 EVALUATION OF WHEEZING: CPT

## 2024-05-15 PROCEDURE — 94664 DEMO&/EVAL PT USE INHALER: CPT

## 2024-05-15 PROCEDURE — 94726 PLETHYSMOGRAPHY LUNG VOLUMES: CPT

## 2024-05-15 PROCEDURE — 94729 DIFFUSING CAPACITY: CPT | Performed by: INTERNAL MEDICINE

## 2024-05-19 DIAGNOSIS — E66.01 CLASS 3 SEVERE OBESITY WITH BODY MASS INDEX (BMI) OF 45.0 TO 49.9 IN ADULT, UNSPECIFIED OBESITY TYPE, UNSPECIFIED WHETHER SERIOUS COMORBIDITY PRESENT (MULTI): ICD-10-CM

## 2024-05-20 PROCEDURE — RXMED WILLOW AMBULATORY MEDICATION CHARGE

## 2024-05-20 RX ORDER — TIRZEPATIDE 2.5 MG/.5ML
2.5 INJECTION, SOLUTION SUBCUTANEOUS
Qty: 2 ML | Refills: 2 | Status: SHIPPED | OUTPATIENT
Start: 2024-05-20

## 2024-05-20 RX ORDER — MELOXICAM 15 MG/1
15 TABLET ORAL DAILY
Qty: 90 TABLET | Refills: 0 | Status: SHIPPED | OUTPATIENT
Start: 2024-05-20

## 2024-05-21 LAB
MGC ASCENT PFT - FEV1 - POST: 3.51
MGC ASCENT PFT - FEV1 - POST: 3.51
MGC ASCENT PFT - FEV1 - PRE: 3.51
MGC ASCENT PFT - FEV1 - PRE: 3.51
MGC ASCENT PFT - FEV1 - PREDICTED: 2.69
MGC ASCENT PFT - FEV1 - PREDICTED: 2.69
MGC ASCENT PFT - FVC - POST: 4.21
MGC ASCENT PFT - FVC - POST: 4.21
MGC ASCENT PFT - FVC - PRE: 4.29
MGC ASCENT PFT - FVC - PRE: 4.29
MGC ASCENT PFT - FVC - PREDICTED: 3.32
MGC ASCENT PFT - FVC - PREDICTED: 3.32

## 2024-05-22 ENCOUNTER — PHARMACY VISIT (OUTPATIENT)
Dept: PHARMACY | Facility: CLINIC | Age: 54
End: 2024-05-22
Payer: MEDICARE

## 2024-05-25 DIAGNOSIS — E55.9 VITAMIN D DEFICIENCY: ICD-10-CM

## 2024-05-28 RX ORDER — ERGOCALCIFEROL 1.25 MG/1
1 CAPSULE ORAL
Qty: 12 CAPSULE | Refills: 0 | Status: SHIPPED | OUTPATIENT
Start: 2024-06-02

## 2024-05-29 DIAGNOSIS — I10 HYPERTENSION, UNSPECIFIED TYPE: ICD-10-CM

## 2024-05-30 RX ORDER — LISINOPRIL 10 MG/1
10 TABLET ORAL DAILY
Qty: 90 TABLET | Refills: 0 | Status: SHIPPED | OUTPATIENT
Start: 2024-05-30

## 2024-06-24 DIAGNOSIS — E66.01 CLASS 3 SEVERE OBESITY WITHOUT SERIOUS COMORBIDITY WITH BODY MASS INDEX (BMI) OF 40.0 TO 44.9 IN ADULT, UNSPECIFIED OBESITY TYPE (MULTI): Primary | ICD-10-CM

## 2024-06-24 PROCEDURE — RXMED WILLOW AMBULATORY MEDICATION CHARGE

## 2024-06-24 RX ORDER — TIRZEPATIDE 5 MG/.5ML
5 INJECTION, SOLUTION SUBCUTANEOUS
Qty: 2 ML | Refills: 2 | Status: SHIPPED | OUTPATIENT
Start: 2024-06-24

## 2024-06-26 ENCOUNTER — LAB (OUTPATIENT)
Dept: LAB | Facility: LAB | Age: 54
End: 2024-06-26
Payer: COMMERCIAL

## 2024-06-26 ENCOUNTER — PHARMACY VISIT (OUTPATIENT)
Dept: PHARMACY | Facility: CLINIC | Age: 54
End: 2024-06-26
Payer: MEDICARE

## 2024-06-26 DIAGNOSIS — J45.50 SEVERE PERSISTENT ASTHMA WITHOUT COMPLICATION (MULTI): ICD-10-CM

## 2024-06-26 PROCEDURE — 36415 COLL VENOUS BLD VENIPUNCTURE: CPT

## 2024-06-26 PROCEDURE — 82785 ASSAY OF IGE: CPT

## 2024-06-27 ENCOUNTER — TELEPHONE (OUTPATIENT)
Dept: PULMONOLOGY | Facility: HOSPITAL | Age: 54
End: 2024-06-27
Payer: COMMERCIAL

## 2024-06-27 DIAGNOSIS — J45.50 SEVERE PERSISTENT ASTHMA WITHOUT COMPLICATION (MULTI): Primary | ICD-10-CM

## 2024-06-27 DIAGNOSIS — J82.83 EOSINOPHILIC ASTHMA (HHS-HCC): ICD-10-CM

## 2024-06-27 DIAGNOSIS — J45.40 MODERATE PERSISTENT ASTHMA, UNSPECIFIED WHETHER COMPLICATED (HHS-HCC): ICD-10-CM

## 2024-06-27 LAB — TOTAL IGE SMQN RAST: 12.5 KU/L

## 2024-06-27 RX ORDER — FLUTICASONE PROPIONATE AND SALMETEROL 50; 500 UG/1; UG/1
1 POWDER RESPIRATORY (INHALATION)
Qty: 3 EACH | Refills: 2 | Status: SHIPPED | OUTPATIENT
Start: 2024-06-27

## 2024-06-27 NOTE — TELEPHONE ENCOUNTER
Spoke to patient on the phone at this time.  We discussed her recent testing.  At my last visit with her my initial plan was to get her started on biologic therapy given her severe persistent asthma requiring multiple courses of steroids a year.  At my last visit with her it was discovered that she was only dosing her Advair once a day instead of the prescribed twice a day and historically speaking she was only using her Advair this amount; sometimes less.  Since my last visit with her she has remained on twice a day Advair dosing and her asthma has never been better.  She seldom needs her albuterol.  She did not start taking the previously prescribed montelukast; was concerned about side effect risk.  Recent PFT did not show any clear obstruction or restriction she did have an increased diffusion capacity.  Her FeNO was normal at 12 ppb.  Her ImmunoCAP IgE level was normal at 13.  It is safe to say that her asthma is eosinophilic driven given her prior serology findings.  Patient states that historically her asthma is worst in October.    At this time, I am holding off on any biologic therapy due to the fact that she is now appropriately taking her Advair and is under very good control at this time.  Due to the fact she was not taking her Advair appropriately in the years past and was having exacerbations; I cannot definitively say that a biologic is fully indicated for her at this time.  Moving forward, if she were to continue to exacerbate requiring courses of prednisone while taking her appropriate dosing of Advair I will then reconsider getting her on biologic therapy to target her eosinophilic inflammation.  Patient is aware and agreeable to plan discussed today.

## 2024-07-09 DIAGNOSIS — J45.40 MODERATE PERSISTENT ASTHMA, UNSPECIFIED WHETHER COMPLICATED (HHS-HCC): ICD-10-CM

## 2024-07-09 RX ORDER — FLUTICASONE PROPIONATE AND SALMETEROL 500; 50 UG/1; UG/1
1 POWDER RESPIRATORY (INHALATION)
Qty: 3 EACH | Refills: 2 | Status: SHIPPED | OUTPATIENT
Start: 2024-07-09 | End: 2024-07-12 | Stop reason: SDUPTHER

## 2024-07-10 ENCOUNTER — TELEPHONE (OUTPATIENT)
Dept: PULMONOLOGY | Facility: HOSPITAL | Age: 54
End: 2024-07-10
Payer: COMMERCIAL

## 2024-07-10 NOTE — TELEPHONE ENCOUNTER
Submitted a verbal prior authorization to [email or web address] [facility] for Adviar.  A decision should be made with in 2-3 days.  PA #[id number].

## 2024-07-12 ENCOUNTER — APPOINTMENT (OUTPATIENT)
Dept: PULMONOLOGY | Facility: CLINIC | Age: 54
End: 2024-07-12
Payer: COMMERCIAL

## 2024-07-12 DIAGNOSIS — J45.40 MODERATE PERSISTENT ASTHMA, UNSPECIFIED WHETHER COMPLICATED (HHS-HCC): ICD-10-CM

## 2024-07-12 RX ORDER — FLUTICASONE PROPIONATE AND SALMETEROL 50; 500 UG/1; UG/1
1 POWDER RESPIRATORY (INHALATION)
Qty: 3 EACH | Refills: 2 | Status: SHIPPED | OUTPATIENT
Start: 2024-07-12

## 2024-07-15 DIAGNOSIS — J45.40 MODERATE PERSISTENT ASTHMA, UNSPECIFIED WHETHER COMPLICATED (HHS-HCC): ICD-10-CM

## 2024-07-16 RX ORDER — FLUTICASONE PROPIONATE AND SALMETEROL 50; 500 UG/1; UG/1
1 POWDER RESPIRATORY (INHALATION) 2 TIMES DAILY
Qty: 60 EACH | Refills: 2 | Status: SHIPPED | OUTPATIENT
Start: 2024-07-16 | End: 2024-07-17 | Stop reason: CLARIF

## 2024-07-17 DIAGNOSIS — J45.50 SEVERE PERSISTENT ASTHMA WITHOUT COMPLICATION (MULTI): Primary | ICD-10-CM

## 2024-07-17 RX ORDER — FLUTICASONE PROPIONATE AND SALMETEROL 50; 500 UG/1; UG/1
1 POWDER RESPIRATORY (INHALATION)
Qty: 1 EACH | Refills: 11 | Status: SHIPPED | OUTPATIENT
Start: 2024-07-17

## 2024-07-18 ENCOUNTER — TELEPHONE (OUTPATIENT)
Dept: PULMONOLOGY | Facility: HOSPITAL | Age: 54
End: 2024-07-18
Payer: COMMERCIAL

## 2024-07-18 NOTE — TELEPHONE ENCOUNTER
Called [facility] requesting that they stop sending notifications requesting for generic Advair be dispensed.  This nurse explained that the patient can't take the generic Advair and can only use name brand.  There is an insurance approval for the name brand Advair on file.

## 2024-07-22 ENCOUNTER — TELEPHONE (OUTPATIENT)
Dept: PRIMARY CARE | Facility: CLINIC | Age: 54
End: 2024-07-22
Payer: COMMERCIAL

## 2024-07-22 DIAGNOSIS — J45.50 SEVERE PERSISTENT ASTHMA WITHOUT COMPLICATION (MULTI): ICD-10-CM

## 2024-07-22 RX ORDER — FLUTICASONE PROPIONATE AND SALMETEROL 50; 500 UG/1; UG/1
1 POWDER RESPIRATORY (INHALATION)
Qty: 3 EACH | Refills: 3 | Status: SHIPPED | OUTPATIENT
Start: 2024-07-22

## 2024-07-24 ENCOUNTER — PHARMACY VISIT (OUTPATIENT)
Dept: PHARMACY | Facility: CLINIC | Age: 54
End: 2024-07-24
Payer: MEDICARE

## 2024-07-24 PROCEDURE — RXMED WILLOW AMBULATORY MEDICATION CHARGE

## 2024-08-09 DIAGNOSIS — E66.01 CLASS 3 SEVERE OBESITY WITH BODY MASS INDEX (BMI) OF 45.0 TO 49.9 IN ADULT, UNSPECIFIED OBESITY TYPE, UNSPECIFIED WHETHER SERIOUS COMORBIDITY PRESENT (MULTI): ICD-10-CM

## 2024-08-09 DIAGNOSIS — R00.2 PALPITATIONS: ICD-10-CM

## 2024-08-09 RX ORDER — DEXLANSOPRAZOLE 60 MG/1
1 CAPSULE, DELAYED RELEASE ORAL DAILY
Qty: 90 CAPSULE | Refills: 0 | Status: SHIPPED | OUTPATIENT
Start: 2024-08-09

## 2024-08-09 RX ORDER — MELOXICAM 15 MG/1
15 TABLET ORAL DAILY
Qty: 90 TABLET | Refills: 0 | Status: SHIPPED | OUTPATIENT
Start: 2024-08-09

## 2024-08-14 DIAGNOSIS — J45.50 SEVERE PERSISTENT ASTHMA WITHOUT COMPLICATION (MULTI): ICD-10-CM

## 2024-08-15 RX ORDER — FLUTICASONE PROPIONATE AND SALMETEROL 50; 500 UG/1; UG/1
POWDER RESPIRATORY (INHALATION)
Qty: 60 EACH | Refills: 3 | Status: SHIPPED | OUTPATIENT
Start: 2024-08-15

## 2024-08-16 DIAGNOSIS — J01.90 ACUTE NON-RECURRENT SINUSITIS, UNSPECIFIED LOCATION: ICD-10-CM

## 2024-08-16 RX ORDER — FLUCONAZOLE 100 MG/1
TABLET ORAL
Qty: 3 TABLET | Refills: 0 | Status: SHIPPED | OUTPATIENT
Start: 2024-08-16

## 2024-08-17 PROCEDURE — RXMED WILLOW AMBULATORY MEDICATION CHARGE

## 2024-08-20 ENCOUNTER — PHARMACY VISIT (OUTPATIENT)
Dept: PHARMACY | Facility: CLINIC | Age: 54
End: 2024-08-20
Payer: MEDICARE

## 2024-08-22 DIAGNOSIS — J45.50 SEVERE PERSISTENT ASTHMA WITHOUT COMPLICATION (MULTI): ICD-10-CM

## 2024-08-22 RX ORDER — FLUTICASONE PROPIONATE AND SALMETEROL 50; 500 UG/1; UG/1
1 POWDER RESPIRATORY (INHALATION)
Qty: 3 EACH | Refills: 3 | Status: SHIPPED | OUTPATIENT
Start: 2024-08-22

## 2024-08-24 DIAGNOSIS — E55.9 VITAMIN D DEFICIENCY: ICD-10-CM

## 2024-08-26 RX ORDER — ERGOCALCIFEROL 1.25 MG/1
1 CAPSULE ORAL
Qty: 12 CAPSULE | Refills: 0 | Status: SHIPPED | OUTPATIENT
Start: 2024-09-01

## 2024-09-19 ENCOUNTER — OFFICE VISIT (OUTPATIENT)
Dept: PRIMARY CARE | Facility: CLINIC | Age: 54
End: 2024-09-19
Payer: COMMERCIAL

## 2024-09-19 VITALS
HEIGHT: 66 IN | WEIGHT: 221.8 LBS | SYSTOLIC BLOOD PRESSURE: 124 MMHG | OXYGEN SATURATION: 99 % | HEART RATE: 85 BPM | TEMPERATURE: 97.9 F | BODY MASS INDEX: 35.65 KG/M2 | DIASTOLIC BLOOD PRESSURE: 80 MMHG | RESPIRATION RATE: 18 BRPM

## 2024-09-19 DIAGNOSIS — Z23 ENCOUNTER FOR IMMUNIZATION: ICD-10-CM

## 2024-09-19 DIAGNOSIS — E66.9 CLASS 2 OBESITY WITHOUT SERIOUS COMORBIDITY WITH BODY MASS INDEX (BMI) OF 36.0 TO 36.9 IN ADULT, UNSPECIFIED OBESITY TYPE: Primary | ICD-10-CM

## 2024-09-19 DIAGNOSIS — E66.01 CLASS 3 SEVERE OBESITY WITHOUT SERIOUS COMORBIDITY WITH BODY MASS INDEX (BMI) OF 40.0 TO 44.9 IN ADULT, UNSPECIFIED OBESITY TYPE: ICD-10-CM

## 2024-09-19 DIAGNOSIS — E66.9 CLASS 2 OBESITY WITH BODY MASS INDEX (BMI) OF 38.0 TO 38.9 IN ADULT, UNSPECIFIED OBESITY TYPE, UNSPECIFIED WHETHER SERIOUS COMORBIDITY PRESENT: ICD-10-CM

## 2024-09-19 PROCEDURE — RXMED WILLOW AMBULATORY MEDICATION CHARGE

## 2024-09-19 RX ORDER — TIRZEPATIDE 5 MG/.5ML
5 INJECTION, SOLUTION SUBCUTANEOUS
Qty: 2 ML | Refills: 2 | Status: SHIPPED | OUTPATIENT
Start: 2024-09-19

## 2024-09-19 ASSESSMENT — PATIENT HEALTH QUESTIONNAIRE - PHQ9
SUM OF ALL RESPONSES TO PHQ9 QUESTIONS 1 AND 2: 0
2. FEELING DOWN, DEPRESSED OR HOPELESS: NOT AT ALL
1. LITTLE INTEREST OR PLEASURE IN DOING THINGS: NOT AT ALL

## 2024-09-19 ASSESSMENT — LIFESTYLE VARIABLES
SKIP TO QUESTIONS 9-10: 1
HOW OFTEN DO YOU HAVE A DRINK CONTAINING ALCOHOL: MONTHLY OR LESS
AUDIT-C TOTAL SCORE: 1
HOW OFTEN DO YOU HAVE SIX OR MORE DRINKS ON ONE OCCASION: NEVER
HOW MANY STANDARD DRINKS CONTAINING ALCOHOL DO YOU HAVE ON A TYPICAL DAY: 1 OR 2

## 2024-09-19 ASSESSMENT — PAIN SCALES - GENERAL: PAINLEVEL: 2

## 2024-09-20 NOTE — PROGRESS NOTES
"Subjective   Patient ID: Dee Mata is a 54 y.o. female who presents for Med Management (PT IS HERE FOR REFILL OF ZEPBOUND. PT WOULD LIKE TO DISCUSS WETHER OR NOT SHE SHOULD STAY ON THE SAME DOSE. ).    Here for follow up of medication, seing pulmonary doing so much better, happy has been making lifestyle chages weight loss over 70 pounds         Review of Systems   All other systems reviewed and are negative.      Objective   /80   Pulse 85   Temp 36.6 °C (97.9 °F)   Resp 18   Ht 1.664 m (5' 5.5\")   Wt 101 kg (221 lb 12.8 oz)   SpO2 99%   BMI 36.35 kg/m²     Physical Exam  Constitutional:       Appearance: Normal appearance.   Cardiovascular:      Rate and Rhythm: Normal rate and regular rhythm.      Pulses: Normal pulses.      Heart sounds: Normal heart sounds.   Pulmonary:      Effort: Pulmonary effort is normal.      Breath sounds: Normal breath sounds.   Neurological:      Mental Status: She is alert and oriented to person, place, and time.   Psychiatric:      Comments: So happy. Really making life style chages. Able to do so much more since weight loss         Assessment/Plan   Problem List Items Addressed This Visit             ICD-10-CM    Obesity - Primary E66.9    Relevant Medications    tirzepatide, weight loss, (Zepbound) 5 mg/0.5 mL injection     Other Visit Diagnoses         Codes    Encounter for immunization     Z23    Relevant Orders    Flu vaccine, trivalent, preservative free, no egg protein, age 18y+ (Flublok) (Completed)        Discussed dosing. And mutual decision will remain on same dose . Return in 3 months       "

## 2024-09-23 ASSESSMENT — ENCOUNTER SYMPTOMS
STRIDOR: 0
HEADACHES: 0
CHILLS: 0
SLEEP DISTURBANCE: 0
MYALGIAS: 0
BRUISES/BLEEDS EASILY: 0
PALPITATIONS: 0
EYE ITCHING: 0
NERVOUS/ANXIOUS: 0
JOINT SWELLING: 0
ACTIVITY CHANGE: 0
TROUBLE SWALLOWING: 0
FATIGUE: 0
NAUSEA: 0
EYE REDNESS: 0
ABDOMINAL PAIN: 0
UNEXPECTED WEIGHT CHANGE: 0
TREMORS: 0
SINUS PAIN: 0
FEVER: 0
APPETITE CHANGE: 0
ROS GI COMMENTS: DENIES ACID REFLUX
VOMITING: 0
DIARRHEA: 0
AGITATION: 0
BACK PAIN: 0
ARTHRALGIAS: 0
VOICE CHANGE: 0
WEAKNESS: 0
DIZZINESS: 0
SORE THROAT: 0

## 2024-09-23 NOTE — PROGRESS NOTES
Patient: Dee Mata    [id number]  : 1970 -- AGE 54 y.o.    Provider: VERONIQUE Reeves-CNP     Location [facility]   Service Date: 2024       Department of Medicine  Division of Pulmonary, Critical Care, and Sleep Medicine       [facility]  Follow Up Visit Note    HISTORY OF PRESENT ILLNESS     PCP: Taisha Mackey CNP     HISTORY OF PRESENT ILLNESS   Dee Mata is a 54 y.o. female who presents to a [facility] for an evaluation with concerns of asthma.  I have independently interviewed and examined the patient in the office and reviewed available records.    DATE OF LAST VISIT: 24    Current History  Since last visit on 2024, patient completed previously ordered PFT testing.  PFT from 5/15/2024 did not show any obstruction or restriction, no bronchodilator response, mild hyperinflation on lung volumes and a hyperdynamic diffusion capacity at 152%.  FeNO was normal at 12 ppb.  ImmunoCAP IgE for asthma phenotyping was normal at 13.  I had spoken to patient on the phone on 2024 (as seen below).  Since patient started using her Advair twice a day; she felt like her asthma has been on wonderful control and seldom requiring albuterol.    On today's visit, she reports since being on her proper dosing of Advair 500; she has felt incredible. She seldom ever needs albuterol and is now able to exercise and not limited by her breathing at all. Denies SOB, NDIAYE, cough, wheezing. Historically, her asthma flares worst beginning of October as well as in March/April. Will see how she does getting through these problem months now on proper inhaler therapy. Discussed asthma exacerbation action plan.  ACT Today: 25    Prior Visits & History   Chart Update  24: Spoke to patient on the phone at this time.  We discussed her recent testing.  At my last visit with her my initial plan was to get her started on  biologic therapy given her severe persistent asthma requiring multiple courses of steroids a year.  At my last visit with her it was discovered that she was only dosing her Advair once a day instead of the prescribed twice a day and historically speaking she was only using her Advair this amount; sometimes less.  Since my last visit with her she has remained on twice a day Advair dosing and her asthma has never been better.  She seldom needs her albuterol.  She did not start taking the previously prescribed montelukast; was concerned about side effect risk.  Recent PFT did not show any clear obstruction or restriction she did have an increased diffusion capacity.  Her FeNO was normal at 12 ppb.  Her ImmunoCAP IgE level was normal at 13.  It is safe to say that her asthma is eosinophilic driven given her prior serology findings.  Patient states that historically her asthma is worst in October.     At this time, I am holding off on any biologic therapy due to the fact that she is now appropriately taking her Advair and is under very good control at this time.  Due to the fact she was not taking her Advair appropriately in the years past and was having exacerbations; I cannot definitively say that a biologic is fully indicated for her at this time.  Moving forward, if she were to continue to exacerbate requiring courses of prednisone while taking her appropriate dosing of Advair I will then reconsider getting her on biologic therapy to target her eosinophilic inflammation.  Patient is aware and agreeable to plan discussed today.    Current History  5/7/24: Patient presents to pulmonary clinic today after referral by primary care for concerns of asthma.  PFT on record.  Upon chart review it appears that patient was given Breztri inhaler and a prednisone taper by the primary care on 4/15/2024.    Patient then requested a refill of the steroids on 4/30/2024.  Looking back it appears that her cell counts have been  elevated; most recent from 1/31/2024 at 390.  Prior counts of 590, 620, 440.  Asthma appears to be eosinophilic in nature.    On today's visit, the patient reports she never started the Breztri; has just continued on her usual Advair 500 (her favorite maintenance inhaler; has been on this for a number of years). Was diagnosed around age 10. Formerly saw Dr. Morataya.   Had COVID 8/2023 (not hospitalized) and ever since then, asthma has drastically worsened. Typically August (when humid) and Fall seasons are her major triggers. Also states pollen can trigger her. States she will only take her Advair 500   once a day typically prior to COVID; but since COVID she has been taking it twice a day. Has only been using the Advair 500 BID as its intended for the past 3-4 weeks consistently. Took the first round of steroids; helped.   Did not take the second round of steroids. Has had 2 courses of prednisone in the past year. Usually needs 2 courses of prednisone every single year. No chronic cough. Has lost 40 lbs in the past 4 months (intentional; on tirzepitide).   Wheezing in the morning. No productive mucous. No recent fever, sweats, chills. No orthopnea, no lower leg swelling. No CP, palpitations. Uses Claritin and Flonase; uses year round. Has not been on Singulair for a number of years.   Over the past 3 weeks, has needed albuterol 3+ days a week.    Denies premature birth. Diagnosed with asthma age 10; undiagnosed years prior. No pulmonary hospitalizations. Has never been on home oxygen therapy before.    Has never completed a sleep study before. She does snore (large tonsils) if laying on her back. No AM headaches. No daytime fatigue.  CAT Today: 4  ACT Today: 17  ESS Today: 2    REVIEW OF SYSTEMS     REVIEW OF SYSTEMS  Review of Systems   Constitutional:  Negative for activity change, appetite change, chills, fatigue, fever and unexpected weight change.   HENT:  Negative for congestion, postnasal drip, rhinorrhea, sinus  pressure, sinus pain, sneezing, sore throat, trouble swallowing and voice change.         Denies throat clearing   Eyes:  Negative for redness and itching.   Respiratory:  Negative for cough, chest tightness, shortness of breath, wheezing and stridor.    Cardiovascular:  Negative for chest pain, palpitations and leg swelling.        Denies orthopnea   Gastrointestinal:  Negative for abdominal pain, diarrhea, nausea and vomiting.        Denies acid reflux   Musculoskeletal:  Negative for arthralgias, back pain, joint swelling and myalgias.   Skin:  Negative for rash.   Allergic/Immunologic: Negative for immunocompromised state.   Neurological:  Negative for dizziness, tremors, weakness and headaches.   Hematological:  Does not bruise/bleed easily.   Psychiatric/Behavioral:  Negative for agitation and sleep disturbance. The patient is not nervous/anxious.         Denies depression   All other systems reviewed and are negative.      ALLERGIES AND MEDICATIONS     ALLERGIES  Allergies   Allergen Reactions    Adhesive Hives    Cigarette Smoke Anaphylaxis    Codeine Nausea/vomiting    House Dust Mite Wheezing    Mold Wheezing    Morphine Nausea/vomiting    Benzoin Unknown    Cephalexin Hives    Clarithromycin Unknown and Other     Vision changes    Morphine Sulfate Unknown       MEDICATIONS  Current Outpatient Medications   Medication Sig Dispense Refill    Advair Diskus 500-50 mcg/dose diskus inhaler Inhale 1 puff 2 times a day. Rinse mouth with water after use to reduce aftertaste and incidence of candidiasis. Do not swallow.  Patient has prior authorization from her insurance company to receive name brand. 3 each 3    albuterol 2.5 mg /3 mL (0.083 %) nebulizer solution Take 3 mL (2.5 mg) by nebulization every 6 hours if needed.      albuterol 90 mcg/actuation inhaler INHALE TWO PUFFS BY MOUTH EVERY 4 HOURS 25.5 g 0    dexlansoprazole (Dexilant) 60 mg DR capsule TAKE ONE CAPSULE BY MOUTH EVERY DAY 90 capsule 0     ergocalciferol (Vitamin D-2) 1.25 MG (30332 UT) capsule TAKE ONE CAPSULE BY MOUTH ONCE A WEEK 12 capsule 0    fluconazole (Diflucan) 100 mg tablet TAKE ONE TABLET BY MOUTH ONCE DAILY FOR 6 DAYS. (Patient not taking: Reported on 9/19/2024) 3 tablet 0    fluticasone (Flonase Allergy Relief) 50 mcg/actuation nasal spray Administer 1 spray into each nostril once daily.      lisinopril 10 mg tablet TAKE ONE TABLET BY MOUTH once DAILY (Patient not taking: Reported on 9/19/2024) 90 tablet 0    loratadine (Claritin) 10 mg tablet Take 1 tablet (10 mg) by mouth once daily.      meloxicam (Mobic) 15 mg tablet TAKE ONE TABLET BY MOUTH ONCE DAILY 90 tablet 0    montelukast (Singulair) 10 mg tablet Take 1 tablet (10 mg) by mouth once daily at bedtime. (Patient not taking: Reported on 9/19/2024) 30 tablet 3    predniSONE (Deltasone) 20 mg tablet Take 3 tabs (60mg) daily for 5 days, then take 2 tabs (40mg) daily for 2 days, then take 1 tab (20mg) daily for 2 days. (Patient not taking: Reported on 5/7/2024) 21 tablet 0    tirzepatide, weight loss, (Zepbound) 2.5 mg/0.5 mL injection Inject 2.5 mg under the skin every 7 days. (Patient not taking: Reported on 9/19/2024) 2 mL 2    tirzepatide, weight loss, (Zepbound) 5 mg/0.5 mL injection Inject 5 mg under the skin every 7 days. (Patient not taking: Reported on 4/15/2024) 0.5 mL 2    tirzepatide, weight loss, (Zepbound) 5 mg/0.5 mL injection Inject 5 mg under the skin every 7 days. 2 mL 2     No current facility-administered medications for this visit.       PAST HISTORY     PAST MEDICAL HISTORY  She  has no past medical history on file.    PAST SURGICAL HISTORY  Past Surgical History:   Procedure Laterality Date    CHOLECYSTECTOMY  2017    HERNIA REPAIR  1977    HERNIA REPAIR  1977    HYSTERECTOMY  2012    TOTAL HIP ARTHROPLASTY Right 04/28/2021       IMMUNIZATION HISTORY  Immunization History   Administered Date(s) Administered    Flu vaccine (IIV4), preservative free *Check age/dose*  09/15/2017, 09/12/2018, 09/26/2019, 09/30/2020, 10/07/2022, 11/10/2023    Flu vaccine, trivalent, preservative free, no egg protein, age 18y+ (Flublok) 09/19/2024    Hepatitis A vaccine, age 19 years and greater (HAVRIX) 10/14/2011    Hepatitis B vaccine, 19 yrs and under (RECOMBIVAX, ENGERIX) 11/30/2011    Hepatitis B vaccine, adult *Check Product/Dose* 10/19/2011, 05/02/2012    Influenza, injectable, quadrivalent 10/29/2014, 10/15/2015, 10/26/2021    Influenza, seasonal, injectable 01/24/2004, 11/25/2011, 11/28/2011, 10/21/2013    Moderna SARS-CoV-2 Vaccination 02/24/2021, 02/25/2021, 03/25/2021, 11/13/2021, 07/19/2022    Tdap vaccine, age 7 year and older (BOOSTRIX, ADACEL) 10/19/2022       SOCIAL HISTORY  She  reports that she has never smoked. She has been exposed to tobacco smoke. She has never used smokeless tobacco. She reports current alcohol use. She reports that she does not currently use drugs.     OCCUPATIONAL/ENVIRONMENTAL HISTORY  Previously worked as: no exposures  Currently works as: teacher x28 years  DOES/DOES NOT: does not have known exposure to asbestos, silica, beryllium or inhaled metals.  DOES/DOES NOT: does not have exposure to birds or exotic animals.    FAMILY HISTORY  Family History   Adopted: Yes   Problem Relation Name Age of Onset    Lupus Mother      Autoimmune disease Father       DOES/DOES NOT: does not have a family history of pulmonary disease.  DOES/DOES NOT: does have a family history of cancer.  DOES/DOES NOT: does have a family history of autoimmune disorders.  -Mother; lupus    PHYSICAL EXAM     VITAL SIGNS: There were no vitals taken for this visit.     PREVIOUS WEIGHTS:  Wt Readings from Last 3 Encounters:   09/19/24 101 kg (221 lb 12.8 oz)   05/07/24 113 kg (250 lb)   04/15/24 115 kg (254 lb)       Physical Exam  Vitals reviewed.   Constitutional:       General: She is not in acute distress.     Appearance: Normal appearance. She is not ill-appearing or toxic-appearing.    HENT:      Head: Normocephalic.      Nose: No rhinorrhea.   Cardiovascular:      Rate and Rhythm: Normal rate and regular rhythm.      Heart sounds: Normal heart sounds.   Pulmonary:      Effort: Pulmonary effort is normal. No respiratory distress.      Breath sounds: Normal breath sounds. No stridor. No wheezing, rhonchi or rales.   Abdominal:      General: Abdomen is flat.   Musculoskeletal:         General: Normal range of motion.      Right lower leg: No edema.      Left lower leg: No edema.   Skin:     General: Skin is warm and dry.      Nails: There is no clubbing.   Neurological:      General: No focal deficit present.      Mental Status: She is alert and oriented to person, place, and time.   Psychiatric:         Mood and Affect: Mood normal.         Behavior: Behavior normal.         Judgment: Judgment normal.         RESULTS/DATA     Pulmonary Function Test Results   PFT  5/15/24: FEV1/FVC: 82, FEV1: 3.51 (130%), FVC: 4.29 (129%), no BD response, YCK82-17: 147%, T.90 (113%), RV/T%, DLCO: 152%    FeNO  5/15/24: 12 ppb    Chest Radiograph   CXR  21: IMPRESSION: No acute cardiopulmonary disease.   2/3/20: IMPRESSION: No acute abnormality.    Chest CT Scan     No results found for this or any previous visit from the past 365 days.      Echocardiogram & Cardiac Studies     No results found for this or any previous visit from the past 365 days.       Labwork & Pathology   Complete Blood Count  Lab Results   Component Value Date    WBC 11.5 (H) 2024    HGB 14.4 2024    HCT 42.3 2024    MCV 90 2024     2024     Peripheral Eosinophil Count:   Eosinophils Absolute   Date Value   2024 0.39 x10*3/uL   2022 0.59 K/UL (H)   2022 0.62 K/UL (H)   2021 0.44 K/UL     Immunocap IgE  Lab Results   Component Value Date    ICIGE 12.5 2024       Bronchoscopy & Pathology/Cultures       ASSESSMENT/PLAN     Ms. Mata is a 54 y.o. female; was  referred to the [facility] for evaluation of asthma.    Problem List and Orders  Diagnoses and all orders for this visit:  Severe persistent asthma without complication (Multi)  Eosinophilic asthma ([facility])  Chronic rhinosinusitis        Assessment and Plan / Recommendations:  Severe Eosinophilic Asthma: Originally diagnosed with asthma around age 10.  Has never been hospitalized for her asthma.  In the majority of her adulthood she has been on Advair 500; typically only uses it once a day as opposed to the prescribed twice a day dosing and felt like she was well-controlled over the years.  However, when she had COVID August 2023; her asthma significantly worsened.  Over the past month she has been taking her Advair 500 as prescribed twice a day; yet is still not well-controlled.  Historically over many years she typically requires at least 2+ courses of oral steroids annually.  Has needed 2 courses in the past calendar year.  No recent PFT on record.  Historically her eosinophils have been elevated; most recent from 1/31/2024 at 390.  Prior counts of 590, 620, 440. Immunocap IgE level 13 on 6/26/24. PFT from 5/15/2024 did not show any obstruction or restriction, no bronchodilator response, mild hyperinflation on lung volumes and a hyperdynamic diffusion capacity at 152%.  FeNO was normal at 12 ppb.   -Continue Advair discus 500; 1 inhalation twice a day.  Rinse mouth after use. (Since switching over to twice daily dosing; was previously only doing once a day, she feels like her asthma is under much better control).  -Continue albuterol HFA as needed  -We had previously discussed biologic therapy if that were to be needed in the future.  Asthma phenotyping is eosinophilic and would likely benefit from either Dupixent or Fasenra if ever needed. Patient prefers home injection through pen if possible.  -On 9/24/24 visit; discussed asthma exacerbation action plan and she knows when to  call me if needed. If she were to need 1 more steroid course moving forward; will get her started on biologic therapy.    2. Chronic Rhinosinusitis  -Continue Flonase and Claritin daily for chronic allergies    RTC 6 months    Nelson Jackson, CNP  [facility] Nurse Practition[facility]    *This note was dictated using DRAGON speech recognition software and was corrected for spelling or grammatical errors, but despite proofreading several typographical errors might be present that might affect the meaning of the content.*

## 2024-09-24 ENCOUNTER — OFFICE VISIT (OUTPATIENT)
Dept: PULMONOLOGY | Facility: CLINIC | Age: 54
End: 2024-09-24
Payer: COMMERCIAL

## 2024-09-24 ENCOUNTER — PHARMACY VISIT (OUTPATIENT)
Dept: PHARMACY | Facility: CLINIC | Age: 54
End: 2024-09-24
Payer: MEDICARE

## 2024-09-24 VITALS
BODY MASS INDEX: 35.36 KG/M2 | HEART RATE: 74 BPM | OXYGEN SATURATION: 99 % | SYSTOLIC BLOOD PRESSURE: 112 MMHG | DIASTOLIC BLOOD PRESSURE: 81 MMHG | WEIGHT: 220 LBS | HEIGHT: 66 IN

## 2024-09-24 DIAGNOSIS — J32.9 CHRONIC RHINOSINUSITIS: ICD-10-CM

## 2024-09-24 DIAGNOSIS — J82.83 EOSINOPHILIC ASTHMA (HHS-HCC): ICD-10-CM

## 2024-09-24 DIAGNOSIS — J45.50 SEVERE PERSISTENT ASTHMA WITHOUT COMPLICATION (MULTI): Primary | ICD-10-CM

## 2024-09-24 PROCEDURE — 1036F TOBACCO NON-USER: CPT | Performed by: NURSE PRACTITIONER

## 2024-09-24 PROCEDURE — 99214 OFFICE O/P EST MOD 30 MIN: CPT | Performed by: NURSE PRACTITIONER

## 2024-09-24 PROCEDURE — 3079F DIAST BP 80-89 MM HG: CPT | Performed by: NURSE PRACTITIONER

## 2024-09-24 PROCEDURE — 3008F BODY MASS INDEX DOCD: CPT | Performed by: NURSE PRACTITIONER

## 2024-09-24 PROCEDURE — 3074F SYST BP LT 130 MM HG: CPT | Performed by: NURSE PRACTITIONER

## 2024-09-24 RX ORDER — FLUTICASONE PROPIONATE AND SALMETEROL 50; 500 UG/1; UG/1
1 POWDER RESPIRATORY (INHALATION)
Qty: 180 EACH | Refills: 3 | Status: SHIPPED | OUTPATIENT
Start: 2024-09-24

## 2024-09-24 RX ORDER — FLUTICASONE PROPIONATE 50 MCG
1 SPRAY, SUSPENSION (ML) NASAL DAILY
COMMUNITY

## 2024-09-24 ASSESSMENT — ENCOUNTER SYMPTOMS
RHINORRHEA: 0
COUGH: 0
CHEST TIGHTNESS: 0
WHEEZING: 0
SHORTNESS OF BREATH: 0
SINUS PRESSURE: 0

## 2024-09-24 ASSESSMENT — PAIN SCALES - GENERAL: PAINLEVEL: 0-NO PAIN

## 2024-09-24 NOTE — PATIENT INSTRUCTIONS
"Today we discussed how you have been feeling. So happy to hear your asthma is doing well and under good control. Please call me if you feel like you are starting another asthma exacerbation.    -Continue Advair discus 500; 1 inhalation twice a day.  Rinse mouth after use.   -Continue Albuterol Inhaler/nebs; 2 puffs/1 unit dose every 4-6 hours as needed for shortness of breath. You can also take this 10-15 minutes prior to exertional activity to help \"prime\" your lungs.  -Continue Claritin and Flonase for allergies  -We discussed biologic therapy today; we will get this started with this treatment if you were to have another exacerbation requiring oral prednisone.    Thank you for visiting the pulmonary clinic today! It was a pleasure to participate in your care.  Please return to clinic 6 months or sooner if needed.    Nelson Jackson, CNP  My Office Number: [phone]   CT Scheduling: [phone]  PFT/Follow Up Visit Scheduling: [phone]  My Nurse: PRASHANT Longoria    To reach the nurse, Swati Lowe RN, please call ([phone]) Swati has a secure voice mail account if you want to leave a message.    **For immediate needs such as medication issues/refills, active sick symptoms/medical concerns; I ask that you please call the office and speak to the pulmonary nurse. MyChart messages do not come directly to me. There can sometimes be a delay before I am aware of any messages that were sent. Thank you.**    "

## 2024-09-25 ENCOUNTER — APPOINTMENT (OUTPATIENT)
Dept: PULMONOLOGY | Facility: CLINIC | Age: 54
End: 2024-09-25
Payer: COMMERCIAL

## 2024-09-25 ENCOUNTER — APPOINTMENT (OUTPATIENT)
Dept: PRIMARY CARE | Facility: CLINIC | Age: 54
End: 2024-09-25
Payer: COMMERCIAL

## 2024-10-16 PROCEDURE — RXMED WILLOW AMBULATORY MEDICATION CHARGE

## 2024-10-24 ENCOUNTER — PHARMACY VISIT (OUTPATIENT)
Dept: PHARMACY | Facility: CLINIC | Age: 54
End: 2024-10-24
Payer: MEDICARE

## 2024-11-08 DIAGNOSIS — R00.2 PALPITATIONS: ICD-10-CM

## 2024-11-08 DIAGNOSIS — E66.813 CLASS 3 SEVERE OBESITY WITH BODY MASS INDEX (BMI) OF 45.0 TO 49.9 IN ADULT, UNSPECIFIED OBESITY TYPE, UNSPECIFIED WHETHER SERIOUS COMORBIDITY PRESENT: ICD-10-CM

## 2024-11-08 DIAGNOSIS — E66.01 CLASS 3 SEVERE OBESITY WITH BODY MASS INDEX (BMI) OF 45.0 TO 49.9 IN ADULT, UNSPECIFIED OBESITY TYPE, UNSPECIFIED WHETHER SERIOUS COMORBIDITY PRESENT: ICD-10-CM

## 2024-11-11 RX ORDER — DEXLANSOPRAZOLE 60 MG/1
1 CAPSULE, DELAYED RELEASE ORAL DAILY
Qty: 90 CAPSULE | Refills: 1 | Status: SHIPPED | OUTPATIENT
Start: 2024-11-11

## 2024-11-11 RX ORDER — MELOXICAM 15 MG/1
15 TABLET ORAL DAILY
Qty: 90 TABLET | Refills: 0 | Status: SHIPPED | OUTPATIENT
Start: 2024-11-11

## 2024-11-15 ENCOUNTER — PHARMACY VISIT (OUTPATIENT)
Dept: PHARMACY | Facility: CLINIC | Age: 54
End: 2024-11-15
Payer: MEDICARE

## 2024-11-15 PROCEDURE — RXMED WILLOW AMBULATORY MEDICATION CHARGE

## 2024-11-25 ENCOUNTER — TELEPHONE (OUTPATIENT)
Dept: PRIMARY CARE | Facility: CLINIC | Age: 54
End: 2024-11-25
Payer: COMMERCIAL

## 2024-11-25 DIAGNOSIS — J40 BRONCHITIS: Primary | ICD-10-CM

## 2024-11-25 RX ORDER — DOXYCYCLINE 100 MG/1
100 CAPSULE ORAL 2 TIMES DAILY
Qty: 20 CAPSULE | Refills: 0 | Status: SHIPPED | OUTPATIENT
Start: 2024-11-25 | End: 2024-12-05

## 2024-12-17 PROCEDURE — RXMED WILLOW AMBULATORY MEDICATION CHARGE

## 2024-12-19 ENCOUNTER — PHARMACY VISIT (OUTPATIENT)
Dept: PHARMACY | Facility: CLINIC | Age: 54
End: 2024-12-19
Payer: MEDICARE

## 2025-01-14 DIAGNOSIS — E66.813 CLASS 3 SEVERE OBESITY WITHOUT SERIOUS COMORBIDITY WITH BODY MASS INDEX (BMI) OF 40.0 TO 44.9 IN ADULT, UNSPECIFIED OBESITY TYPE: ICD-10-CM

## 2025-01-14 DIAGNOSIS — E66.01 CLASS 3 SEVERE OBESITY WITHOUT SERIOUS COMORBIDITY WITH BODY MASS INDEX (BMI) OF 40.0 TO 44.9 IN ADULT, UNSPECIFIED OBESITY TYPE: ICD-10-CM

## 2025-01-16 PROCEDURE — RXMED WILLOW AMBULATORY MEDICATION CHARGE

## 2025-01-16 RX ORDER — TIRZEPATIDE 5 MG/.5ML
5 INJECTION, SOLUTION SUBCUTANEOUS
Qty: 2 ML | Refills: 0 | Status: SHIPPED | OUTPATIENT
Start: 2025-01-16

## 2025-01-20 ENCOUNTER — OFFICE VISIT (OUTPATIENT)
Dept: URGENT CARE | Age: 55
End: 2025-01-20
Payer: COMMERCIAL

## 2025-01-20 VITALS
HEIGHT: 67 IN | RESPIRATION RATE: 16 BRPM | HEART RATE: 100 BPM | OXYGEN SATURATION: 96 % | DIASTOLIC BLOOD PRESSURE: 74 MMHG | BODY MASS INDEX: 32.35 KG/M2 | TEMPERATURE: 97.7 F | WEIGHT: 206.13 LBS | SYSTOLIC BLOOD PRESSURE: 120 MMHG

## 2025-01-20 DIAGNOSIS — J02.0 STREP PHARYNGITIS: Primary | ICD-10-CM

## 2025-01-20 DIAGNOSIS — H69.93 EUSTACHIAN TUBE DYSFUNCTION, BILATERAL: ICD-10-CM

## 2025-01-20 DIAGNOSIS — J02.9 SORE THROAT: ICD-10-CM

## 2025-01-20 DIAGNOSIS — Z20.822 SUSPECTED COVID-19 VIRUS INFECTION: ICD-10-CM

## 2025-01-20 LAB
POC RAPID INFLUENZA A: NEGATIVE
POC RAPID INFLUENZA B: NEGATIVE
POC RAPID STREP: POSITIVE
POC SARS-COV-2 AG BINAX: NORMAL

## 2025-01-20 RX ORDER — AMOXICILLIN 500 MG/1
500 CAPSULE ORAL 2 TIMES DAILY
Qty: 20 CAPSULE | Refills: 0 | Status: SHIPPED | OUTPATIENT
Start: 2025-01-20 | End: 2025-01-30

## 2025-01-20 RX ORDER — FLUCONAZOLE 150 MG/1
150 TABLET ORAL SEE ADMIN INSTRUCTIONS
Qty: 2 TABLET | Refills: 0 | Status: SHIPPED | OUTPATIENT
Start: 2025-01-20 | End: 2025-01-21

## 2025-01-20 ASSESSMENT — PAIN SCALES - GENERAL: PAINLEVEL_OUTOF10: 5

## 2025-01-20 NOTE — PATIENT INSTRUCTIONS
Take medications as prescribed.  Maintain adequate hydration and nutrition.  If symptoms worsen, do not improve, or any other concerning or worrisome symptoms develop please return to the clinic or go to the emergency department.  Follow-up with PCP in 1 to 2 weeks.

## 2025-01-20 NOTE — PROGRESS NOTES
Subjective   Patient ID: Dee Mata is a 54 y.o. female. They present today with a chief complaint of Fever (Since yesterday), Headache, Earache, Sore Throat, and Nausea.    History of Present Illness  54-year-old female presents urgent care for complaint of fever, sore throat, ear pain, nausea started yesterday.  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  Denies current vomiting, chills, sweats, chest pain, shortness of breath, abdominal pain.  COVID and flu are negative.  Rapid strep positive.  States she has taken amoxicillin in the past without any complications.  States she does get yeast infections with antibiotics.  Prescribed amoxicillin and Diflucan.  Educated on supportive care.  Follow-up PCP 1 to 2 weeks.  Return/ER precautions.  Patient agrees with plan.          Past Medical History  Allergies as of 01/20/2025 - Reviewed 01/20/2025   Allergen Reaction Noted    Adhesive Hives 02/05/2021    Cigarette smoke Anaphylaxis 02/05/2021    Codeine Nausea/vomiting 02/05/2021    House dust mite Wheezing 02/05/2021    Mold Wheezing 02/05/2021    Morphine Nausea/vomiting 02/05/2021    Benzoin Unknown 11/09/2023    Cephalexin Hives 01/05/2017    Clarithromycin Unknown and Other 01/05/2017    Morphine sulfate Unknown 11/09/2023       (Not in a hospital admission)       No past medical history on file.    Past Surgical History:   Procedure Laterality Date    CHOLECYSTECTOMY  2017    HERNIA REPAIR  1977    HERNIA REPAIR  1977    HYSTERECTOMY  2012    TOTAL HIP ARTHROPLASTY Right 04/28/2021        reports that she has never smoked. She has been exposed to tobacco smoke. She has never used smokeless tobacco. She reports current alcohol use. She reports that she does not currently use drugs.    Review of Systems  Review of Systems   All other systems reviewed and are negative.                                 Objective    Vitals:    01/20/25 0816   BP: 120/74   Pulse: 100   Resp: 16   Temp: 36.5 °C  "(97.7 °F)   SpO2: 96%   Weight: 93.5 kg (206 lb 2.1 oz)   Height: 1.689 m (5' 6.5\")     No LMP recorded. Patient has had a hysterectomy.    Physical Exam  Vitals reviewed.   Constitutional:       General: She is not in acute distress.     Appearance: Normal appearance. She is not ill-appearing, toxic-appearing or diaphoretic.   HENT:      Head: Normocephalic and atraumatic.      Right Ear: Tympanic membrane, ear canal and external ear normal.      Left Ear: Tympanic membrane, ear canal and external ear normal.      Nose: Congestion present.      Mouth/Throat:      Mouth: Mucous membranes are moist.      Comments: Tonsils bilaterally mildly enlarged with erythema and with exudate.  Uvula midline and normal.  Airway clear.  Cardiovascular:      Rate and Rhythm: Normal rate and regular rhythm.   Pulmonary:      Effort: Pulmonary effort is normal. No respiratory distress.      Breath sounds: Normal breath sounds. No stridor. No wheezing, rhonchi or rales.   Abdominal:      General: Abdomen is flat.      Palpations: Abdomen is soft.      Tenderness: There is no abdominal tenderness. There is no right CVA tenderness or left CVA tenderness.   Musculoskeletal:      Cervical back: Normal range of motion and neck supple. No rigidity or tenderness.   Lymphadenopathy:      Cervical: No cervical adenopathy.   Skin:     General: Skin is warm and dry.   Neurological:      General: No focal deficit present.      Mental Status: She is alert and oriented to person, place, and time.   Psychiatric:         Mood and Affect: Mood normal.         Behavior: Behavior normal.         Procedures    Point of Care Test & Imaging Results from this visit  No results found for this visit on 01/20/25.   No results found.    Diagnostic study results (if any) were reviewed by Alesia Case PA-C.    Assessment/Plan   Allergies, medications, history, and pertinent labs/EKGs/Imaging reviewed by Alesia Case PA-C.     Medical Decision " Making  54-year-old female presents urgent care for complaint of fever, sore throat, ear pain, nausea started yesterday.  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  Denies current vomiting, chills, sweats, chest pain, shortness of breath, abdominal pain.  COVID and flu are negative.  Rapid strep positive.  States she has taken amoxicillin in the past without any complications.  States she does get yeast infections with antibiotics.  Prescribed amoxicillin and Diflucan.  Educated on supportive care.  Follow-up PCP 1 to 2 weeks.  Return/ER precautions.  Patient agrees with plan.    Orders and Diagnoses  Diagnoses and all orders for this visit:  Suspected COVID-19 virus infection  -     POCT Covid-19 Rapid Antigen  Sore throat  -     POCT Influenza A/B manually resulted  -     POCT rapid strep A manually resulted      Medical Admin Record      Patient disposition: Home    Electronically signed by Alesia Case PA-C  8:24 AM

## 2025-01-22 ENCOUNTER — PHARMACY VISIT (OUTPATIENT)
Dept: PHARMACY | Facility: CLINIC | Age: 55
End: 2025-01-22
Payer: MEDICARE

## 2025-02-06 ENCOUNTER — PHARMACY VISIT (OUTPATIENT)
Dept: PHARMACY | Facility: CLINIC | Age: 55
End: 2025-02-06
Payer: MEDICARE

## 2025-02-06 ENCOUNTER — OFFICE VISIT (OUTPATIENT)
Dept: PRIMARY CARE | Facility: CLINIC | Age: 55
End: 2025-02-06
Payer: COMMERCIAL

## 2025-02-06 VITALS
HEIGHT: 66 IN | DIASTOLIC BLOOD PRESSURE: 84 MMHG | HEART RATE: 87 BPM | RESPIRATION RATE: 18 BRPM | BODY MASS INDEX: 32.56 KG/M2 | WEIGHT: 202.6 LBS | TEMPERATURE: 97.5 F | OXYGEN SATURATION: 98 % | SYSTOLIC BLOOD PRESSURE: 132 MMHG

## 2025-02-06 DIAGNOSIS — J45.40 MODERATE PERSISTENT ASTHMA, UNSPECIFIED WHETHER COMPLICATED (HHS-HCC): ICD-10-CM

## 2025-02-06 DIAGNOSIS — I10 HYPERTENSION, UNSPECIFIED TYPE: ICD-10-CM

## 2025-02-06 DIAGNOSIS — Z13.29 SCREENING FOR THYROID DISORDER: ICD-10-CM

## 2025-02-06 DIAGNOSIS — E66.813 CLASS 3 SEVERE OBESITY WITH BODY MASS INDEX (BMI) OF 45.0 TO 49.9 IN ADULT, UNSPECIFIED OBESITY TYPE, UNSPECIFIED WHETHER SERIOUS COMORBIDITY PRESENT: ICD-10-CM

## 2025-02-06 DIAGNOSIS — Z00.00 WELL ADULT EXAM: Primary | ICD-10-CM

## 2025-02-06 DIAGNOSIS — Z12.11 ENCOUNTER FOR SCREENING FOR MALIGNANT NEOPLASM OF COLON: ICD-10-CM

## 2025-02-06 DIAGNOSIS — E66.01 CLASS 3 SEVERE OBESITY WITHOUT SERIOUS COMORBIDITY WITH BODY MASS INDEX (BMI) OF 40.0 TO 44.9 IN ADULT, UNSPECIFIED OBESITY TYPE: ICD-10-CM

## 2025-02-06 DIAGNOSIS — Z01.419 WELL WOMAN EXAM: ICD-10-CM

## 2025-02-06 DIAGNOSIS — Z11.51 SCREENING FOR HPV (HUMAN PAPILLOMAVIRUS): ICD-10-CM

## 2025-02-06 DIAGNOSIS — Z12.11 SCREEN FOR COLON CANCER: ICD-10-CM

## 2025-02-06 DIAGNOSIS — E66.01 CLASS 3 SEVERE OBESITY WITH BODY MASS INDEX (BMI) OF 45.0 TO 49.9 IN ADULT, UNSPECIFIED OBESITY TYPE, UNSPECIFIED WHETHER SERIOUS COMORBIDITY PRESENT: ICD-10-CM

## 2025-02-06 DIAGNOSIS — E55.9 VITAMIN D DEFICIENCY: ICD-10-CM

## 2025-02-06 DIAGNOSIS — Z12.31 ENCOUNTER FOR SCREENING MAMMOGRAM FOR MALIGNANT NEOPLASM OF BREAST: ICD-10-CM

## 2025-02-06 DIAGNOSIS — Z13.220 ENCOUNTER FOR SCREENING FOR LIPID DISORDER: ICD-10-CM

## 2025-02-06 DIAGNOSIS — E66.812 CLASS 2 OBESITY WITHOUT SERIOUS COMORBIDITY WITH BODY MASS INDEX (BMI) OF 36.0 TO 36.9 IN ADULT, UNSPECIFIED OBESITY TYPE: ICD-10-CM

## 2025-02-06 DIAGNOSIS — E66.813 CLASS 3 SEVERE OBESITY WITHOUT SERIOUS COMORBIDITY WITH BODY MASS INDEX (BMI) OF 40.0 TO 44.9 IN ADULT, UNSPECIFIED OBESITY TYPE: ICD-10-CM

## 2025-02-06 DIAGNOSIS — Z12.72 VAGINAL SMEAR: ICD-10-CM

## 2025-02-06 DIAGNOSIS — Z11.59 ENCOUNTER FOR HEPATITIS C SCREENING TEST FOR LOW RISK PATIENT: ICD-10-CM

## 2025-02-06 DIAGNOSIS — N95.1 HOT FLASH, MENOPAUSAL: ICD-10-CM

## 2025-02-06 PROCEDURE — 3079F DIAST BP 80-89 MM HG: CPT | Performed by: NURSE PRACTITIONER

## 2025-02-06 PROCEDURE — RXMED WILLOW AMBULATORY MEDICATION CHARGE

## 2025-02-06 PROCEDURE — 3008F BODY MASS INDEX DOCD: CPT | Performed by: NURSE PRACTITIONER

## 2025-02-06 PROCEDURE — 1036F TOBACCO NON-USER: CPT | Performed by: NURSE PRACTITIONER

## 2025-02-06 PROCEDURE — 99212 OFFICE O/P EST SF 10 MIN: CPT | Performed by: NURSE PRACTITIONER

## 2025-02-06 PROCEDURE — 99396 PREV VISIT EST AGE 40-64: CPT | Performed by: NURSE PRACTITIONER

## 2025-02-06 PROCEDURE — 3075F SYST BP GE 130 - 139MM HG: CPT | Performed by: NURSE PRACTITIONER

## 2025-02-06 RX ORDER — MELOXICAM 15 MG/1
15 TABLET ORAL DAILY
Qty: 90 TABLET | Refills: 1 | Status: SHIPPED | OUTPATIENT
Start: 2025-02-06

## 2025-02-06 RX ORDER — ERGOCALCIFEROL 1.25 MG/1
1 CAPSULE ORAL
Qty: 12 CAPSULE | Refills: 3 | Status: SHIPPED | OUTPATIENT
Start: 2025-02-09

## 2025-02-06 RX ORDER — TIRZEPATIDE 2.5 MG/.5ML
2.5 INJECTION, SOLUTION SUBCUTANEOUS
Qty: 6 ML | Refills: 2 | Status: SHIPPED | OUTPATIENT
Start: 2025-02-06

## 2025-02-06 ASSESSMENT — LIFESTYLE VARIABLES
HOW OFTEN DO YOU HAVE SIX OR MORE DRINKS ON ONE OCCASION: NEVER
HOW OFTEN DO YOU HAVE A DRINK CONTAINING ALCOHOL: MONTHLY OR LESS
SKIP TO QUESTIONS 9-10: 1
AUDIT-C TOTAL SCORE: 1
HOW MANY STANDARD DRINKS CONTAINING ALCOHOL DO YOU HAVE ON A TYPICAL DAY: 1 OR 2

## 2025-02-06 ASSESSMENT — PAIN SCALES - GENERAL: PAINLEVEL_OUTOF10: 0-NO PAIN

## 2025-02-06 NOTE — PROGRESS NOTES
"Subjective   Patient ID: Dee Mata is a 54 y.o. female who presents for Annual Exam (PT IS HERE FOR ANNUAL EXAM ).    Here for well visit  doing great. Has lost 90 pounds has really made lifesyle changes  diet exercise something to be really of, asthma better, so far no excerebrations! happy         Review of Systems   All other systems reviewed and are negative.      Objective   /84   Pulse 87   Temp 36.4 °C (97.5 °F)   Resp 18   Ht 1.676 m (5' 6\")   Wt 91.9 kg (202 lb 9.6 oz)   SpO2 98%   BMI 32.70 kg/m²     Physical Exam  Vitals and nursing note reviewed.   Constitutional:       General: She is not in acute distress.     Appearance: Normal appearance.   HENT:      Right Ear: Tympanic membrane and ear canal normal.      Left Ear: Tympanic membrane and ear canal normal.      Nose: Nose normal. No rhinorrhea.      Mouth/Throat:      Pharynx: Oropharynx is clear. No oropharyngeal exudate or posterior oropharyngeal erythema.      Comments: Dentition wnl  Eyes:      Extraocular Movements: Extraocular movements intact.      Conjunctiva/sclera: Conjunctivae normal.      Pupils: Pupils are equal, round, and reactive to light.   Neck:      Vascular: No carotid bruit.   Cardiovascular:      Rate and Rhythm: Normal rate and regular rhythm.      Heart sounds: Normal heart sounds. No murmur heard.  Pulmonary:      Breath sounds: Normal breath sounds. No wheezing or rhonchi.   Abdominal:      General: Bowel sounds are normal. There is no distension.      Palpations: Abdomen is soft. There is no mass.      Tenderness: There is no abdominal tenderness. There is no guarding or rebound.      Hernia: No hernia is present.   Genitourinary:     Comments: Breast exam  nl  pap done  Musculoskeletal:         General: No swelling or tenderness. Normal range of motion.      Cervical back: Normal range of motion and neck supple.   Lymphadenopathy:      Cervical: No cervical adenopathy.   Skin:     General: Skin is warm. "      Findings: No rash.   Neurological:      General: No focal deficit present.      Mental Status: She is alert.   Psychiatric:         Behavior: Behavior normal.         Assessment/Plan   Problem List Items Addressed This Visit             ICD-10-CM    Vitamin D deficiency E55.9    Relevant Medications    ergocalciferol (Vitamin D-2) 1.25 MG (85105 UT) capsule (Start on 2/9/2025)    Other Relevant Orders    Vitamin D 25-Hydroxy,Total (for eval of Vitamin D levels)    Hypertension I10    Obesity E66.9    Relevant Medications    meloxicam (Mobic) 15 mg tablet    tirzepatide, weight loss, (Zepbound) 2.5 mg/0.5 mL injection     Other Visit Diagnoses         Codes    Well adult exam    -  Primary Z00.00    Relevant Orders    CT cardiac scoring wo IV contrast    CBC and Auto Differential    Comprehensive Metabolic Panel    Moderate persistent asthma, unspecified whether complicated ([facility])     J45.40    Encounter for screening mammogram for malignant neoplasm of breast     Z12.31    Encounter for hepatitis C screening test for low risk patient     Z11.59    Relevant Orders    BI mammo bilateral screening tomosynthesis    Hepatitis C Antibody    Well woman exam     Z01.419    Relevant Orders    THINPREP PAP TEST    Vaginal smear     Z12.72    Relevant Orders    THINPREP PAP TEST    Screening for HPV (human papillomavirus)     Z11.51    Relevant Orders    THINPREP PAP TEST    Screening for thyroid disorder     Z13.29    Relevant Orders    TSH with reflex to Free T4 if abnormal    Encounter for screening for lipid disorder     Z13.220    Relevant Orders    Lipid Panel    Encounter for screening for malignant neoplasm of colon     Z12.11    Hot flash, menopausal     N95.1    Relevant Orders    FSH    Screen for colon cancer     Z12.11    Relevant Orders    Cologuard® colon cancer screening        Discussed going down on zep bound contiue current diet congrats, wwingl jean carlos as test results come in

## 2025-02-07 LAB
25(OH)D3+25(OH)D2 SERPL-MCNC: 64 NG/ML (ref 30–100)
ALBUMIN SERPL-MCNC: 4.5 G/DL (ref 3.6–5.1)
ALP SERPL-CCNC: 64 U/L (ref 37–153)
ALT SERPL-CCNC: 12 U/L (ref 6–29)
ANION GAP SERPL CALCULATED.4IONS-SCNC: 10 MMOL/L (CALC) (ref 7–17)
AST SERPL-CCNC: 13 U/L (ref 10–35)
BASOPHILS # BLD AUTO: 107 CELLS/UL (ref 0–200)
BASOPHILS NFR BLD AUTO: 1.1 %
BILIRUB SERPL-MCNC: 0.5 MG/DL (ref 0.2–1.2)
BUN SERPL-MCNC: 26 MG/DL (ref 7–25)
CALCIUM SERPL-MCNC: 9.8 MG/DL (ref 8.6–10.4)
CHLORIDE SERPL-SCNC: 104 MMOL/L (ref 98–110)
CHOLEST SERPL-MCNC: 146 MG/DL
CHOLEST/HDLC SERPL: 3 (CALC)
CO2 SERPL-SCNC: 26 MMOL/L (ref 20–32)
CREAT SERPL-MCNC: 0.5 MG/DL (ref 0.5–1.03)
EGFRCR SERPLBLD CKD-EPI 2021: 113 ML/MIN/1.73M2
EOSINOPHIL # BLD AUTO: 291 CELLS/UL (ref 15–500)
EOSINOPHIL NFR BLD AUTO: 3 %
ERYTHROCYTE [DISTWIDTH] IN BLOOD BY AUTOMATED COUNT: 11.7 % (ref 11–15)
FSH SERPL-ACNC: 44 MIU/ML
GLUCOSE SERPL-MCNC: 82 MG/DL (ref 65–99)
HCT VFR BLD AUTO: 46.8 % (ref 35–45)
HCV AB SERPL QL IA: NORMAL
HDLC SERPL-MCNC: 49 MG/DL
HGB BLD-MCNC: 15.5 G/DL (ref 11.7–15.5)
LDLC SERPL CALC-MCNC: 81 MG/DL (CALC)
LYMPHOCYTES # BLD AUTO: 2221 CELLS/UL (ref 850–3900)
LYMPHOCYTES NFR BLD AUTO: 22.9 %
MCH RBC QN AUTO: 30.9 PG (ref 27–33)
MCHC RBC AUTO-ENTMCNC: 33.1 G/DL (ref 32–36)
MCV RBC AUTO: 93.4 FL (ref 80–100)
MONOCYTES # BLD AUTO: 601 CELLS/UL (ref 200–950)
MONOCYTES NFR BLD AUTO: 6.2 %
NEUTROPHILS # BLD AUTO: 6480 CELLS/UL (ref 1500–7800)
NEUTROPHILS NFR BLD AUTO: 66.8 %
NONHDLC SERPL-MCNC: 97 MG/DL (CALC)
PLATELET # BLD AUTO: 254 THOUSAND/UL (ref 140–400)
PMV BLD REES-ECKER: 12.5 FL (ref 7.5–12.5)
POTASSIUM SERPL-SCNC: 4.2 MMOL/L (ref 3.5–5.3)
PROT SERPL-MCNC: 7 G/DL (ref 6.1–8.1)
RBC # BLD AUTO: 5.01 MILLION/UL (ref 3.8–5.1)
SODIUM SERPL-SCNC: 140 MMOL/L (ref 135–146)
TRIGL SERPL-MCNC: 76 MG/DL
TSH SERPL-ACNC: 1.88 MIU/L
WBC # BLD AUTO: 9.7 THOUSAND/UL (ref 3.8–10.8)

## 2025-02-10 ENCOUNTER — OFFICE VISIT (OUTPATIENT)
Dept: URGENT CARE | Age: 55
End: 2025-02-10
Payer: COMMERCIAL

## 2025-02-10 DIAGNOSIS — J02.0 STREP PHARYNGITIS: Primary | ICD-10-CM

## 2025-02-10 RX ORDER — AMOXICILLIN AND CLAVULANATE POTASSIUM 875; 125 MG/1; MG/1
875 TABLET, FILM COATED ORAL 2 TIMES DAILY
Qty: 20 TABLET | Refills: 0 | Status: SHIPPED | OUTPATIENT
Start: 2025-02-10

## 2025-02-10 RX ORDER — FLUCONAZOLE 150 MG/1
150 TABLET ORAL ONCE
Qty: 1 TABLET | Refills: 0 | Status: SHIPPED | OUTPATIENT
Start: 2025-02-10 | End: 2025-02-10

## 2025-02-10 NOTE — PROGRESS NOTES
Urgent Care Virtual Video Visit    Patient Location: [facility]  Provider Location: [facility]    Video visit completed with realtime synchronous video/audio connection. Informed consent was obtained from the patient. Patient was made aware that my evaluation and diagnosis are limited due to the fact that we are not in the same room during the interview and that this is a virtual encounter that took place via videoconferencing. Patient verbalized understanding.     HPI:  Pt is a 54 yr old female who presents with throat pain she had strep 2 weeks ago but did not finish her abx and now her tonsils are swollen again    ALLERGIES / ADVERSE REACTIONS:  Allergies: Keflex  MEDICATIONS:  Current Medications: No current medications noted by patient.  PROBLEMS:  Major or Chronic Illnesses: No known illnesses noted by patient.  Family Hx: none pertinent to this visit  Social Hx: no smoking, no drug use, no alcohol     ROS:  Gen: No fatigue, No fever, sweats.  Head: No headache, trauma.  Eyes: No vision loss, double vision, drainage, eye pain.  ENT: No hearing changes, + throat pain, epistaxis, No congestion  Cardiac: No chest pain  Pulmonary: No shortness of breath, no pleuritic pain, No cough  Heme/lymph: No swollen glands  GI: No abdominal pain, nausea, no vomiting, diarrhea  : No dysuria, frequency, urgency, hematuria  Musculoskeletal: No limb pain, joint pain, back pain, joint swelling or stiffness. No myalgia  Skin: No rashes, pruritus, lumps, lesions.  Neuro: No Numbness, tingling, or weakness.  Psych: No anxiety     Review of systems is otherwise negative unless stated above or in history of present illness.     Physical exam:   (limited due to virtual visit)  General:  Pt is alert, no acute distress  HENMT: PERRL, EOMs intact, Conjunctiva pink with no erythema or exudates. No rhinorrhea and rhinitis.  + tonsillar swelling and exudate  Resp: Respiratory effort is normal, no retractions, no stridor. No  cough  Skel: full range of motion of upper and lower extremities.  Neuro: Normal gait, CN II-XII intact, no motor or sensory changes.  Psych: Alert and oriented ×3, judgment is appropriate, normal mood and affect     MDM:    Based on history and physical exam- findings consistent with strep pharyngitis and confirmed with strep test. No evidence of PTA, deep neck infection, or sepsis. Will start on antibiotics today.  Encouraged continuation of symptomatic and supportive care measures. Advised to follow-up with primary care provider in 3-5 days if symptoms persist, return with any new or worsening symptoms.  Patient verbalized understanding and agreeable with plan.           DX: Strep  Treatment: augmentin 875 mg bid 10 days/diflucan   Discharge instructions: You are diagnosed with strep. You were called in a prescription for antibiotics take them as directed. Gargle with warm salt water for comfort. Take Tylenol or Motrin for pain. Throw away your toothbrush in 72 hours and replace with a new one. Follow-up with your primary care doctor as needed.  Patient disposition: Home    Electronically signed by VIET Wheeler  12:31 PM

## 2025-02-11 ENCOUNTER — TELEPHONE (OUTPATIENT)
Dept: PRIMARY CARE | Facility: CLINIC | Age: 55
End: 2025-02-11
Payer: COMMERCIAL

## 2025-02-11 DIAGNOSIS — B37.2 YEAST DERMATITIS: Primary | ICD-10-CM

## 2025-02-12 ENCOUNTER — HOSPITAL ENCOUNTER (OUTPATIENT)
Dept: RADIOLOGY | Facility: HOSPITAL | Age: 55
Discharge: HOME | End: 2025-02-12
Payer: COMMERCIAL

## 2025-02-12 ENCOUNTER — APPOINTMENT (OUTPATIENT)
Dept: RADIOLOGY | Facility: HOSPITAL | Age: 55
End: 2025-02-12
Payer: COMMERCIAL

## 2025-02-12 VITALS — WEIGHT: 202 LBS | BODY MASS INDEX: 32.47 KG/M2 | HEIGHT: 66 IN

## 2025-02-12 DIAGNOSIS — Z11.59 ENCOUNTER FOR HEPATITIS C SCREENING TEST FOR LOW RISK PATIENT: ICD-10-CM

## 2025-02-12 PROCEDURE — 77063 BREAST TOMOSYNTHESIS BI: CPT | Performed by: STUDENT IN AN ORGANIZED HEALTH CARE EDUCATION/TRAINING PROGRAM

## 2025-02-12 PROCEDURE — 77067 SCR MAMMO BI INCL CAD: CPT | Performed by: STUDENT IN AN ORGANIZED HEALTH CARE EDUCATION/TRAINING PROGRAM

## 2025-02-12 PROCEDURE — 77067 SCR MAMMO BI INCL CAD: CPT

## 2025-02-12 RX ORDER — FLUCONAZOLE 100 MG/1
100 TABLET ORAL DAILY
Qty: 3 TABLET | Refills: 0 | Status: SHIPPED | OUTPATIENT
Start: 2025-02-12 | End: 2025-02-15

## 2025-02-21 LAB
CYTOLOGY CMNT CVX/VAG CYTO-IMP: NORMAL
LAB AP HPV GENOTYPE QUESTION: NO
LAB AP HPV HR: NORMAL
LABORATORY COMMENT REPORT: NORMAL
MENSTRUAL HX REPORTED: NORMAL
PATH REPORT.TOTAL CANCER: NORMAL

## 2025-03-03 LAB — NONINV COLON CA DNA+OCC BLD SCRN STL QL: NEGATIVE

## 2025-03-04 PROCEDURE — RXMED WILLOW AMBULATORY MEDICATION CHARGE

## 2025-03-12 ENCOUNTER — PHARMACY VISIT (OUTPATIENT)
Dept: PHARMACY | Facility: CLINIC | Age: 55
End: 2025-03-12
Payer: MEDICARE

## 2025-03-12 ENCOUNTER — TELEPHONE (OUTPATIENT)
Dept: PRIMARY CARE | Facility: CLINIC | Age: 55
End: 2025-03-12
Payer: COMMERCIAL

## 2025-03-12 DIAGNOSIS — Z01.84 ANTIBODY RESPONSE EXAM: Primary | ICD-10-CM

## 2025-03-19 ENCOUNTER — TELEPHONE (OUTPATIENT)
Dept: PRIMARY CARE | Facility: CLINIC | Age: 55
End: 2025-03-19
Payer: COMMERCIAL

## 2025-03-19 DIAGNOSIS — J45.50 SEVERE PERSISTENT ASTHMA WITHOUT COMPLICATION (MULTI): Primary | ICD-10-CM

## 2025-03-21 NOTE — PROGRESS NOTES
Patient: Dee Mata    MRN: [id number]  : 1970 -- AGE: 54 y.o.    Provider: VIET Reeves     Location [facility]   Service Date: 3/25/2025         Department of Medicine  Division of Pulmonary, Critical Care, and Sleep Medicine     [facility]  Follow Up Visit Note    HISTORY OF PRESENT ILLNESS     PCP: Taisha Mackey CNP     HISTORY OF PRESENT ILLNESS   Dee Mata is a 54 y.o. female who presents to a [facility] for a follow up visit with concerns of asthma.   I have independently interviewed and examined the patient in the office and reviewed available records.    DATE OF LAST VISIT: 2024    Current History  On today's visit, She reports her asthma has remained under excellent control.  She continues on Advair discus 501 inhalation twice a day.  Seldom ever requiring albuterol.  States that she did have a sinus infection about 1 to 2 weeks ago and did feel like she needed to use her albuterol nebulizer; otherwise, very stable.  Denies any coughing or wheezing issues.  States that she was able to go outside for a walk for approximately 1 hour yesterday and had no limitation from her breathing.  She expressed her interest in possibly stepping down to Advair 250 concentration from the 500.  I was fine with attempting this and we will send in a new prescription for her.  Worst-case, if she has unable to tolerate the lesser strength we will go back to the 500 concentration which we know works very well for her.  Advised to monitor the frequency of albuterol need moving forward.  ACT: 24    Prior Visits & History   24: Since last visit on 2024, patient completed previously ordered PFT testing.  PFT from 5/15/2024 did not show any obstruction or restriction, no bronchodilator response, mild hyperinflation on lung volumes and a hyperdynamic diffusion capacity at 152%.  FeNO was normal at 12  ppb.  ImmunoCAP IgE for asthma phenotyping was normal at 13.  I had spoken to patient on the phone on 6/27/2024 (as seen below).  Since patient started using her Advair twice a day; she felt like her asthma has been on wonderful control and seldom requiring albuterol.    On today's visit, she reports since being on her proper dosing of Advair 500; she has felt incredible. She seldom ever needs albuterol and is now able to exercise and not limited by her breathing at all. Denies SOB, NDIAYE, cough, wheezing. Historically, her asthma flares worst beginning of October as well as in March/April. Will see how she does getting through these problem months now on proper inhaler therapy. Discussed asthma exacerbation action plan.  ACT: 25    Chart Update  06/27/24: Spoke to patient on the phone at this time.  We discussed her recent testing.  At my last visit with her my initial plan was to get her started on biologic therapy given her severe persistent asthma requiring multiple courses of steroids a year.  At my last visit with her it was discovered that she was only dosing her Advair once a day instead of the prescribed twice a day and historically speaking she was only using her Advair this amount; sometimes less.  Since my last visit with her she has remained on twice a day Advair dosing and her asthma has never been better.  She seldom needs her albuterol.  She did not start taking the previously prescribed montelukast; was concerned about side effect risk.  Recent PFT did not show any clear obstruction or restriction she did have an increased diffusion capacity.  Her FeNO was normal at 12 ppb.  Her ImmunoCAP IgE level was normal at 13.  It is safe to say that her asthma is eosinophilic driven given her prior serology findings.  Patient states that historically her asthma is worst in October.     At this time, I am holding off on any biologic therapy due to the fact that she is now appropriately taking her Advair and is  under very good control at this time.  Due to the fact she was not taking her Advair appropriately in the years past and was having exacerbations; I cannot definitively say that a biologic is fully indicated for her at this time.  Moving forward, if she were to continue to exacerbate requiring courses of prednisone while taking her appropriate dosing of Advair I will then reconsider getting her on biologic therapy to target her eosinophilic inflammation.  Patient is aware and agreeable to plan discussed today.    05/7/24: Patient presents to pulmonary clinic today after referral by primary care for concerns of asthma.  PFT on record.  Upon chart review it appears that patient was given Breztri inhaler and a prednisone taper by the primary care on 4/15/2024.    Patient then requested a refill of the steroids on 4/30/2024.  Looking back it appears that her cell counts have been elevated; most recent from 1/31/2024 at 390.  Prior counts of 590, 620, 440.  Asthma appears to be eosinophilic in nature.    On today's visit, the patient reports she never started the Breztri; has just continued on her usual Advair 500 (her favorite maintenance inhaler; has been on this for a number of years). Was diagnosed around age 10. Formerly saw Dr. Morataya.   Had COVID 8/2023 (not hospitalized) and ever since then, asthma has drastically worsened. Typically August (when humid) and Fall seasons are her major triggers. Also states pollen can trigger her. States she will only take her Advair 500   once a day typically prior to COVID; but since COVID she has been taking it twice a day. Has only been using the Advair 500 BID as its intended for the past 3-4 weeks consistently. Took the first round of steroids; helped.   Did not take the second round of steroids. Has had 2 courses of prednisone in the past year. Usually needs 2 courses of prednisone every single year. No chronic cough. Has lost 40 lbs in the past 4 months (intentional; on  tirzepitide).   Wheezing in the morning. No productive mucous. No recent fever, sweats, chills. No orthopnea, no lower leg swelling. No CP, palpitations. Uses Claritin and Flonase; uses year round. Has not been on Singulair for a number of years.   Over the past 3 weeks, has needed albuterol 3+ days a week.    Denies premature birth. Diagnosed with asthma age 10; undiagnosed years prior. No pulmonary hospitalizations. Has never been on home oxygen therapy before.    Has never completed a sleep study before. She does snore (large tonsils) if laying on her back. No AM headaches. No daytime fatigue.  CAT Today: 4  ACT Today: 17  ESS Today: 2    REVIEW OF SYSTEMS     REVIEW OF SYSTEMS  Review of Systems   Constitutional:  Negative for activity change, appetite change, chills, fatigue, fever and unexpected weight change.   HENT:  Negative for congestion, postnasal drip, rhinorrhea, sinus pressure, sinus pain, sneezing, sore throat, trouble swallowing and voice change.         Denies throat clearing   Eyes:  Negative for redness and itching.   Respiratory:  Negative for cough, chest tightness, shortness of breath, wheezing and stridor.    Cardiovascular:  Negative for chest pain, palpitations and leg swelling.        Denies orthopnea   Gastrointestinal:  Negative for abdominal pain, diarrhea, nausea and vomiting.        Denies acid reflux   Musculoskeletal:  Negative for arthralgias, back pain, joint swelling and myalgias.   Skin:  Negative for rash.   Allergic/Immunologic: Negative for immunocompromised state.   Neurological:  Negative for dizziness, tremors, weakness and headaches.   Hematological:  Does not bruise/bleed easily.   Psychiatric/Behavioral:  Negative for agitation and sleep disturbance. The patient is not nervous/anxious.         Denies depression   All other systems reviewed and are negative.      ALLERGIES & MEDICATIONS     ALLERGIES  Allergies   Allergen Reactions    Adhesive Hives    Cigarette Smoke  Anaphylaxis    Codeine Nausea/vomiting    House Dust Mite Wheezing    Mold Wheezing    Morphine Nausea/vomiting    Benzoin Unknown    Cephalexin Hives    Clarithromycin Unknown and Other     Vision changes    Morphine Sulfate Unknown       MEDICATIONS  Current Outpatient Medications   Medication Sig Dispense Refill    Advair Diskus 500-50 mcg/dose diskus inhaler Inhale 1 puff 2 times a day. Rinse mouth with water after use to reduce aftertaste and incidence of candidiasis. Do not swallow. Patient has prior authorization from her insurance company to receive name brand. PRAKASH; must be name brand Advair. 180 each 3    albuterol 2.5 mg /3 mL (0.083 %) nebulizer solution Take 3 mL (2.5 mg) by nebulization every 6 hours if needed.      albuterol 90 mcg/actuation inhaler INHALE TWO PUFFS BY MOUTH EVERY 4 HOURS 25.5 g 0    dexlansoprazole (Dexilant) 60 mg DR capsule TAKE ONE CAPSULE BY MOUTH EVERY DAY 90 capsule 1    ergocalciferol (Vitamin D-2) 1.25 MG (33500 UT) capsule Take 1 capsule (1,250 mcg) by mouth 1 (one) time per week. 12 capsule 3    fluticasone (Flonase) 50 mcg/actuation nasal spray Administer 1 spray into each nostril once daily. Shake gently. Before first use, prime pump. After use, clean tip and replace cap.      loratadine (Claritin) 10 mg tablet Take 1 tablet (10 mg) by mouth once daily.      meloxicam (Mobic) 15 mg tablet Take 1 tablet (15 mg) by mouth once daily. 90 tablet 1    tirzepatide, weight loss, (Zepbound) 2.5 mg/0.5 mL injection Inject 2.5 mg under the skin every 7 days. 6 mL 2    amoxicillin-pot clavulanate (Augmentin) 875-125 mg tablet Take 1 tablet (875 mg) by mouth 2 times a day. 20 tablet 0    tirzepatide, weight loss, (Zepbound) 5 mg/0.5 mL injection Inject 5 mg under the skin every 7 days. (Patient not taking: Reported on 3/25/2025) 2 mL 0     No current facility-administered medications for this visit.       PAST HISTORY     PAST MEDICAL HISTORY  She  has a past medical history of Breast  "cyst ([id number]).    PAST SURGICAL HISTORY  Past Surgical History:   Procedure Laterality Date    CHOLECYSTECTOMY  2017    HERNIA REPAIR  1977    HERNIA REPAIR  1977    HYSTERECTOMY  2012    TOTAL HIP ARTHROPLASTY Right 04/28/2021       IMMUNIZATION HISTORY  Immunization History   Administered Date(s) Administered    Flu vaccine (IIV4), preservative free *Check age/dose* 09/15/2017, 09/12/2018, 09/26/2019, 09/30/2020, 10/07/2022, 11/10/2023    Flu vaccine, trivalent, preservative free, no egg protein, age 18y+ (Flublok) 09/19/2024    Hepatitis A vaccine, age 19 years and greater (HAVRIX) 10/14/2011    Hepatitis B vaccine, 19 yrs and under (RECOMBIVAX, ENGERIX) 11/30/2011    Hepatitis B vaccine, adult *Check Product/Dose* 10/19/2011, 05/02/2012    Influenza, injectable, quadrivalent 10/29/2014, 10/15/2015, 10/26/2021    Influenza, seasonal, injectable 01/24/2004, 11/25/2011, 11/28/2011, 10/21/2013    Moderna SARS-CoV-2 Vaccination 02/24/2021, 02/25/2021, 03/25/2021, 11/13/2021, 07/19/2022    Tdap vaccine, age 7 year and older (BOOSTRIX, ADACEL) 10/19/2022       SOCIAL HISTORY  She  reports that she has never smoked. She has been exposed to tobacco smoke. She has never used smokeless tobacco. She reports current alcohol use. She reports that she does not currently use drugs.     OCCUPATIONAL/ENVIRONMENTAL HISTORY  Previously worked as: no exposures  Currently works as: teacher x28 years  Exposure Hx:  [x]None  []Asbestos  []Silica  []Beryllium/Inhaled Metals  []Birds  []Exotic Animals  []Other    FAMILY HISTORY  Family History   Adopted: Yes   Problem Relation Name Age of Onset    Lupus Mother 74     Autoimmune disease Father 74     Other (half sister) Sister 1      []Family History of Pulmonary Disease  []Family History of Lung Cancer  [x]Family History of Autoimmune Disease    PHYSICAL EXAM     VITAL SIGNS: /86   Pulse 75   Ht 1.689 m (5' 6.5\")   Wt 91.2 kg (201 lb)   SpO2 98%   BMI 31.96 kg/m²  "     PREVIOUS WEIGHTS:  Wt Readings from Last 3 Encounters:   25 91.2 kg (201 lb)   25 91.6 kg (202 lb)   25 91.9 kg (202 lb 9.6 oz)       Physical Exam  Vitals reviewed.   Constitutional:       General: She is not in acute distress.     Appearance: Normal appearance. She is not ill-appearing or toxic-appearing.   HENT:      Head: Normocephalic.      Nose: No rhinorrhea.   Cardiovascular:      Rate and Rhythm: Normal rate and regular rhythm.      Heart sounds: Normal heart sounds.   Pulmonary:      Effort: Pulmonary effort is normal. No respiratory distress.      Breath sounds: Normal breath sounds. No stridor. No wheezing, rhonchi or rales.   Abdominal:      General: Abdomen is flat.   Musculoskeletal:         General: Normal range of motion.      Right lower leg: No edema.      Left lower leg: No edema.   Skin:     General: Skin is warm and dry.      Nails: There is no clubbing.   Neurological:      General: No focal deficit present.      Mental Status: She is alert and oriented to person, place, and time.   Psychiatric:         Mood and Affect: Mood normal.         Behavior: Behavior normal.         Judgment: Judgment normal.         RESULTS/DATA     Pulmonary Function Test Results   PFT  5/15/24: FEV1/FVC: 82, FEV1: 3.51 (130%), FVC: 4.29 (129%), no BD response, TLY09-06: 147%, T.90 (113%), RV/T%, DLCO: 152%    FeNO  5/15/24: 12 ppb    Chest Radiograph   CXR  21: IMPRESSION: No acute cardiopulmonary disease.   2/3/20: IMPRESSION: No acute abnormality.    Chest CT Scan     No results found for this or any previous visit from the past 365 days.      Echocardiogram & Cardiac Studies     No results found for this or any previous visit from the past 365 days.       Labwork & Pathology   Complete Blood Count  Lab Results   Component Value Date    WBC 9.7 2025    HGB 15.5 2025    HCT 46.8 (H) 2025    MCV 93.4 2025     2025     Peripheral Eosinophil  Count:   Eosinophils Absolute   Date Value   01/31/2024 0.39 x10*3/uL   03/23/2022 0.59 K/UL (H)   03/01/2022 0.62 K/UL (H)   08/12/2021 0.44 K/UL     ABSOLUTE EOSINOPHILS (cells/uL)   Date Value   02/06/2025 291     Immunocap IgE  Lab Results   Component Value Date    ICIGE 12.5 06/26/2024       Bronchoscopy & Pathology/Cultures     ASSESSMENT/PLAN     Ms. Mata is a 54 y.o. female; presents to the [facility] for follow up of asthma    Problem List and Orders  Diagnoses and all orders for this visit:  Severe persistent asthma without complication (Multi)  -     Advair Diskus 250-50 mcg/dose diskus inhaler; Inhale 1 puff 2 times a day. Rinse mouth with water after use to reduce aftertaste and incidence of candidiasis. Do not swallow.  -     albuterol 2.5 mg /3 mL (0.083 %) nebulizer solution; Take 3 mL (2.5 mg) by nebulization every 4 hours if needed for wheezing or shortness of breath.  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  Has never been hospitalized for her asthma.  In the majority of her adulthood she has been on Advair 500; typically only uses it once a day as opposed to the prescribed twice a day dosing and felt like she was well-controlled over the years.  However, when she had COVID August 2023; her asthma significantly worsened.  Over the past month she has been taking her Advair 500 as prescribed twice a day; yet is still not well-controlled.  Historically over many years she typically requires at least 2+ courses of oral steroids annually.  Has needed 2 courses in the past calendar year.  No recent PFT on record.  Historically her eosinophils have been elevated; most recent from 1/31/2024 at 390.  Prior counts of 590, 620, 440. Immunocap IgE level 13 on 6/26/24. PFT from 5/15/2024 did not show any obstruction or restriction, no  bronchodilator response, mild hyperinflation on lung volumes and a hyperdynamic diffusion capacity at 152%.  FeNO was normal at 12 ppb.   -Stop Advair 500  -Start Advair 250; 1 inhalation BID  -Continue albuterol HFA as needed  -Rx for albuterol nebs PRN  -We had previously discussed biologic therapy if that were to be needed in the future.  Asthma phenotyping is eosinophilic and would likely benefit from either Dupixent or Fasenra if ever needed. Patient prefers home injection through pen if possible.  -As of 3/25/25 visit; asthma is very well controlled and she feels fantastic. She is interested in attempting stepping down to Advair 250. Will give this a try and see how she responds. Monitor albuterol demand moving forward. Can always go back to Advair 500 in the future if needed.    2. Chronic Rhinosinusitis  -Continue Flonase and Claritin daily for chronic allergies    RTC 12 months    Nelson Jackson, CNP  [facility]    *This note was dictated using DRAGON speech recognition software and was corrected for spelling or grammatical errors, but despite proofreading several typographical errors might be present that might affect the meaning of the content.*

## 2025-03-25 ENCOUNTER — APPOINTMENT (OUTPATIENT)
Dept: RADIOLOGY | Facility: HOSPITAL | Age: 55
End: 2025-03-25
Payer: COMMERCIAL

## 2025-03-25 ENCOUNTER — OFFICE VISIT (OUTPATIENT)
Dept: PULMONOLOGY | Facility: CLINIC | Age: 55
End: 2025-03-25
Payer: COMMERCIAL

## 2025-03-25 VITALS
HEIGHT: 67 IN | DIASTOLIC BLOOD PRESSURE: 86 MMHG | OXYGEN SATURATION: 98 % | HEART RATE: 75 BPM | BODY MASS INDEX: 31.55 KG/M2 | SYSTOLIC BLOOD PRESSURE: 132 MMHG | WEIGHT: 201 LBS

## 2025-03-25 DIAGNOSIS — J32.9 CHRONIC RHINOSINUSITIS: ICD-10-CM

## 2025-03-25 DIAGNOSIS — J45.50 SEVERE PERSISTENT ASTHMA WITHOUT COMPLICATION (MULTI): Primary | ICD-10-CM

## 2025-03-25 DIAGNOSIS — J82.83 EOSINOPHILIC ASTHMA (HHS-HCC): ICD-10-CM

## 2025-03-25 PROCEDURE — 3008F BODY MASS INDEX DOCD: CPT | Performed by: NURSE PRACTITIONER

## 2025-03-25 PROCEDURE — 3079F DIAST BP 80-89 MM HG: CPT | Performed by: NURSE PRACTITIONER

## 2025-03-25 PROCEDURE — 99214 OFFICE O/P EST MOD 30 MIN: CPT | Performed by: NURSE PRACTITIONER

## 2025-03-25 PROCEDURE — 3075F SYST BP GE 130 - 139MM HG: CPT | Performed by: NURSE PRACTITIONER

## 2025-03-25 PROCEDURE — 1036F TOBACCO NON-USER: CPT | Performed by: NURSE PRACTITIONER

## 2025-03-25 RX ORDER — FLUTICASONE PROPIONATE AND SALMETEROL 50; 250 UG/1; UG/1
1 POWDER RESPIRATORY (INHALATION)
Qty: 60 EACH | Refills: 11 | Status: SHIPPED | OUTPATIENT
Start: 2025-03-25

## 2025-03-25 RX ORDER — ALBUTEROL SULFATE 0.83 MG/ML
2.5 SOLUTION RESPIRATORY (INHALATION) EVERY 4 HOURS PRN
Qty: 360 ML | Refills: 5 | Status: SHIPPED | OUTPATIENT
Start: 2025-03-25

## 2025-03-25 ASSESSMENT — ENCOUNTER SYMPTOMS
TROUBLE SWALLOWING: 0
SINUS PRESSURE: 0
FATIGUE: 0
SLEEP DISTURBANCE: 0
SORE THROAT: 0
DIARRHEA: 0
ABDOMINAL PAIN: 0
WEAKNESS: 0
UNEXPECTED WEIGHT CHANGE: 0
ARTHRALGIAS: 0
NERVOUS/ANXIOUS: 0
PALPITATIONS: 0
NAUSEA: 0
ACTIVITY CHANGE: 0
FEVER: 0
BRUISES/BLEEDS EASILY: 0
VOMITING: 0
WHEEZING: 0
EYE REDNESS: 0
DIZZINESS: 0
HEADACHES: 0
STRIDOR: 0
AGITATION: 0
COUGH: 0
TREMORS: 0
EYE ITCHING: 0
CHILLS: 0
RHINORRHEA: 0
SINUS PAIN: 0
APPETITE CHANGE: 0
BACK PAIN: 0
VOICE CHANGE: 0
ROS GI COMMENTS: DENIES ACID REFLUX
SHORTNESS OF BREATH: 0
JOINT SWELLING: 0
CHEST TIGHTNESS: 0
MYALGIAS: 0

## 2025-03-25 ASSESSMENT — PATIENT HEALTH QUESTIONNAIRE - PHQ9
1. LITTLE INTEREST OR PLEASURE IN DOING THINGS: NOT AT ALL
SUM OF ALL RESPONSES TO PHQ9 QUESTIONS 1 & 2: 0
2. FEELING DOWN, DEPRESSED OR HOPELESS: NOT AT ALL

## 2025-03-25 ASSESSMENT — LIFESTYLE VARIABLES
AUDIT-C TOTAL SCORE: 1
SKIP TO QUESTIONS 9-10: 1
HOW OFTEN DO YOU HAVE SIX OR MORE DRINKS ON ONE OCCASION: NEVER
HOW MANY STANDARD DRINKS CONTAINING ALCOHOL DO YOU HAVE ON A TYPICAL DAY: 1 OR 2
HOW OFTEN DO YOU HAVE A DRINK CONTAINING ALCOHOL: MONTHLY OR LESS

## 2025-03-25 ASSESSMENT — PAIN SCALES - GENERAL: PAINLEVEL_OUTOF10: 0-NO PAIN

## 2025-03-25 NOTE — PATIENT INSTRUCTIONS
"Today we discussed how you have been feeling. So happy to hear your asthma is doing well and under good control.     -Start Advair Diskus 250 (fluticasone + salmeterol); 1 inhalation twice a day. Rinse mouth after use to avoid thrush. This is your long acting daily maintenance inhaler. Please contact my nurse if you have any difficulty getting this prescription.  -Stop Advair 500 once you get the lowered 250 strength  -Please monitor frequency of need of the albuterol inhaler.  If you find yourself needing to use this most days of the week/multiple times a day; please give one of my nurses to call and we will look to get you back on the 500 concentration.  -Continue Albuterol Inhaler/nebs; 2 puffs/1 unit dose every 4-6 hours as needed for shortness of breath. You can also take this 10-15 minutes prior to exertional activity to help \"prime\" your lungs.  -Continue Claritin and Flonase for allergies      Thank you for visiting the pulmonary clinic today! It was a pleasure to participate in your care.  Please return to clinic 1 year or sooner if needed.    Nelson Jackson, CNP  My Office Number: [phone]   CT Scheduling: [phone]  PFT (Pulmonary Function Test) Scheduling: [phone]  Follow Up Visit Scheduling: [phone]  My Nurse: Swati Lowe RN & Key Montano, RN    To reach the nurse, Swati Lowe RN, please call ([phone]). If unable to reach Swati, please contact Key Montano RN at ([phone]). Both nurses have secure voicemail's if needing to leave a message.    **For urgent needs such as medication issues/refills, active sick symptoms or medical concerns please call the office directly and speak to the pulmonary nurse. For nonurgent messages please use [employer]. Thank you.**    "

## 2025-03-26 ENCOUNTER — APPOINTMENT (OUTPATIENT)
Dept: ORTHOPEDIC SURGERY | Facility: CLINIC | Age: 55
End: 2025-03-26
Payer: COMMERCIAL

## 2025-04-02 PROCEDURE — RXMED WILLOW AMBULATORY MEDICATION CHARGE

## 2025-04-08 ENCOUNTER — PHARMACY VISIT (OUTPATIENT)
Dept: PHARMACY | Facility: CLINIC | Age: 55
End: 2025-04-08
Payer: MEDICARE

## 2025-04-22 ENCOUNTER — APPOINTMENT (OUTPATIENT)
Dept: RADIOLOGY | Facility: HOSPITAL | Age: 55
End: 2025-04-22
Payer: COMMERCIAL

## 2025-04-23 ENCOUNTER — APPOINTMENT (OUTPATIENT)
Dept: ORTHOPEDIC SURGERY | Facility: CLINIC | Age: 55
End: 2025-04-23
Payer: COMMERCIAL

## 2025-05-01 PROCEDURE — RXMED WILLOW AMBULATORY MEDICATION CHARGE

## 2025-05-08 ENCOUNTER — PHARMACY VISIT (OUTPATIENT)
Dept: PHARMACY | Facility: CLINIC | Age: 55
End: 2025-05-08
Payer: MEDICARE

## 2025-05-20 ENCOUNTER — TELEPHONE (OUTPATIENT)
Dept: PRIMARY CARE | Facility: CLINIC | Age: 55
End: 2025-05-20
Payer: COMMERCIAL

## 2025-05-20 DIAGNOSIS — B37.0 THRUSH: Primary | ICD-10-CM

## 2025-05-20 DIAGNOSIS — R00.2 PALPITATIONS: ICD-10-CM

## 2025-05-20 RX ORDER — CLOTRIMAZOLE 10 MG/1
10 LOZENGE ORAL
Qty: 35 TABLET | Refills: 1 | Status: SHIPPED | OUTPATIENT
Start: 2025-05-20 | End: 2025-05-27

## 2025-05-20 RX ORDER — DEXLANSOPRAZOLE 60 MG/1
1 CAPSULE, DELAYED RELEASE ORAL DAILY
Qty: 90 CAPSULE | Refills: 0 | Status: SHIPPED | OUTPATIENT
Start: 2025-05-20

## 2025-06-02 ENCOUNTER — PHARMACY VISIT (OUTPATIENT)
Dept: PHARMACY | Facility: CLINIC | Age: 55
End: 2025-06-02
Payer: MEDICARE

## 2025-06-02 PROCEDURE — RXMED WILLOW AMBULATORY MEDICATION CHARGE

## 2025-08-05 DIAGNOSIS — J45.50 SEVERE PERSISTENT ASTHMA WITHOUT COMPLICATION (MULTI): ICD-10-CM

## 2025-08-05 RX ORDER — FLUTICASONE PROPIONATE AND SALMETEROL 50; 500 UG/1; UG/1
1 POWDER RESPIRATORY (INHALATION)
Qty: 3 EACH | Refills: 3 | Status: SHIPPED | OUTPATIENT
Start: 2025-08-05

## 2025-08-18 DIAGNOSIS — R00.2 PALPITATIONS: ICD-10-CM

## 2025-08-18 DIAGNOSIS — E66.813 CLASS 3 SEVERE OBESITY WITH BODY MASS INDEX (BMI) OF 45.0 TO 49.9 IN ADULT, UNSPECIFIED OBESITY TYPE, UNSPECIFIED WHETHER SERIOUS COMORBIDITY PRESENT: ICD-10-CM

## 2025-08-18 RX ORDER — MELOXICAM 15 MG/1
15 TABLET ORAL DAILY
Qty: 90 TABLET | Refills: 0 | Status: SHIPPED | OUTPATIENT
Start: 2025-08-18

## 2025-08-18 RX ORDER — DEXLANSOPRAZOLE 60 MG/1
1 CAPSULE, DELAYED RELEASE ORAL DAILY
Qty: 90 CAPSULE | Refills: 1 | Status: SHIPPED | OUTPATIENT
Start: 2025-08-18

## 2025-08-19 ENCOUNTER — TELEPHONE (OUTPATIENT)
Dept: PULMONOLOGY | Facility: HOSPITAL | Age: 55
End: 2025-08-19
Payer: COMMERCIAL

## 2025-08-19 DIAGNOSIS — J45.50 SEVERE PERSISTENT ASTHMA WITHOUT COMPLICATION (MULTI): ICD-10-CM

## 2025-08-19 RX ORDER — FLUTICASONE FUROATE AND VILANTEROL TRIFENATATE 100; 25 UG/1; UG/1
POWDER RESPIRATORY (INHALATION)
Refills: 3 | OUTPATIENT
Start: 2025-08-19

## 2025-08-20 ENCOUNTER — DOCUMENTATION (OUTPATIENT)
Dept: PULMONOLOGY | Facility: CLINIC | Age: 55
End: 2025-08-20
Payer: COMMERCIAL

## 2025-08-20 DIAGNOSIS — J45.50 SEVERE PERSISTENT ASTHMA WITHOUT COMPLICATION (MULTI): Primary | ICD-10-CM

## 2025-08-20 DIAGNOSIS — J82.83 EOSINOPHILIC ASTHMA (HHS-HCC): ICD-10-CM

## 2026-03-24 ENCOUNTER — APPOINTMENT (OUTPATIENT)
Dept: PULMONOLOGY | Facility: CLINIC | Age: 56
End: 2026-03-24
Payer: COMMERCIAL